# Patient Record
Sex: FEMALE | Race: OTHER | Employment: FULL TIME | URBAN - NONMETROPOLITAN AREA
[De-identification: names, ages, dates, MRNs, and addresses within clinical notes are randomized per-mention and may not be internally consistent; named-entity substitution may affect disease eponyms.]

---

## 2017-03-20 ENCOUNTER — OFFICE VISIT (OUTPATIENT)
Dept: URGENT CARE | Age: 22
End: 2017-03-20
Payer: MEDICAID

## 2017-03-20 VITALS
SYSTOLIC BLOOD PRESSURE: 100 MMHG | BODY MASS INDEX: 25.78 KG/M2 | OXYGEN SATURATION: 97 % | DIASTOLIC BLOOD PRESSURE: 64 MMHG | RESPIRATION RATE: 20 BRPM | HEART RATE: 69 BPM | TEMPERATURE: 98.5 F | WEIGHT: 132 LBS

## 2017-03-20 DIAGNOSIS — J06.9 URI WITH COUGH AND CONGESTION: Primary | ICD-10-CM

## 2017-03-20 DIAGNOSIS — J02.9 SORE THROAT: ICD-10-CM

## 2017-03-20 DIAGNOSIS — R52 BODY ACHES: ICD-10-CM

## 2017-03-20 LAB
INFLUENZA A ANTIBODY: NEGATIVE
INFLUENZA B ANTIBODY: NEGATIVE

## 2017-03-20 PROCEDURE — 87804 INFLUENZA ASSAY W/OPTIC: CPT | Performed by: NURSE PRACTITIONER

## 2017-03-20 PROCEDURE — 99213 OFFICE O/P EST LOW 20 MIN: CPT | Performed by: NURSE PRACTITIONER

## 2017-03-20 RX ORDER — GUAIFENESIN AND DEXTROMETHORPHAN HYDROBROMIDE 600; 30 MG/1; MG/1
1 TABLET, EXTENDED RELEASE ORAL EVERY 12 HOURS
Qty: 28 TABLET | Refills: 0 | Status: SHIPPED | OUTPATIENT
Start: 2017-03-20 | End: 2018-06-06

## 2017-03-20 RX ORDER — BENZONATATE 100 MG/1
100 CAPSULE ORAL 3 TIMES DAILY PRN
Qty: 21 CAPSULE | Refills: 0 | Status: SHIPPED | OUTPATIENT
Start: 2017-03-20 | End: 2017-03-27

## 2017-03-20 RX ORDER — METHYLPREDNISOLONE 4 MG/1
TABLET ORAL
Qty: 1 KIT | Refills: 0 | Status: SHIPPED | OUTPATIENT
Start: 2017-03-20 | End: 2017-03-26

## 2017-03-20 ASSESSMENT — ENCOUNTER SYMPTOMS
COUGH: 1
VOMITING: 0
NAUSEA: 0
SORE THROAT: 1
SINUS PRESSURE: 1
DIARRHEA: 0

## 2018-06-06 ENCOUNTER — HOSPITAL ENCOUNTER (EMERGENCY)
Age: 23
Discharge: HOME | End: 2018-06-07
Payer: MEDICAID

## 2018-06-06 ENCOUNTER — APPOINTMENT (OUTPATIENT)
Dept: CT IMAGING | Age: 23
End: 2018-06-06
Payer: MEDICAID

## 2018-06-06 DIAGNOSIS — K52.9 GASTROENTERITIS: Primary | ICD-10-CM

## 2018-06-06 LAB
ALBUMIN SERPL-MCNC: 4.7 G/DL (ref 3.5–5.2)
ALP BLD-CCNC: 65 U/L (ref 35–104)
ALT SERPL-CCNC: 9 U/L (ref 5–33)
ANION GAP SERPL CALCULATED.3IONS-SCNC: 12 MMOL/L (ref 7–19)
AST SERPL-CCNC: 13 U/L (ref 5–32)
BACTERIA: NORMAL /HPF
BASOPHILS ABSOLUTE: 0.1 K/UL (ref 0–0.2)
BASOPHILS RELATIVE PERCENT: 0.6 % (ref 0–1)
BILIRUB SERPL-MCNC: <0.2 MG/DL (ref 0.2–1.2)
BILIRUBIN URINE: NEGATIVE
BLOOD, URINE: NEGATIVE
BUN BLDV-MCNC: 13 MG/DL (ref 6–20)
CALCIUM SERPL-MCNC: 9.2 MG/DL (ref 8.6–10)
CHLORIDE BLD-SCNC: 103 MMOL/L (ref 98–111)
CLARITY: ABNORMAL
CO2: 27 MMOL/L (ref 22–29)
COLOR: YELLOW
CREAT SERPL-MCNC: 0.6 MG/DL (ref 0.5–0.9)
EOSINOPHILS ABSOLUTE: 0.3 K/UL (ref 0–0.6)
EOSINOPHILS RELATIVE PERCENT: 2 % (ref 0–5)
EPITHELIAL CELLS, UA: 4 /HPF (ref 0–5)
GFR NON-AFRICAN AMERICAN: >60
GLUCOSE BLD-MCNC: 92 MG/DL (ref 74–109)
GLUCOSE URINE: NEGATIVE MG/DL
HCG(URINE) PREGNANCY TEST: NEGATIVE
HCT VFR BLD CALC: 39.9 % (ref 37–47)
HEMOGLOBIN: 12.8 G/DL (ref 12–16)
HYALINE CASTS: 4 /HPF (ref 0–8)
KETONES, URINE: NEGATIVE MG/DL
LEUKOCYTE ESTERASE, URINE: NEGATIVE
LIPASE: 29 U/L (ref 13–60)
LYMPHOCYTES ABSOLUTE: 3.9 K/UL (ref 1.1–4.5)
LYMPHOCYTES RELATIVE PERCENT: 31.8 % (ref 20–40)
MCH RBC QN AUTO: 29.3 PG (ref 27–31)
MCHC RBC AUTO-ENTMCNC: 32.1 G/DL (ref 33–37)
MCV RBC AUTO: 91.3 FL (ref 81–99)
MONOCYTES ABSOLUTE: 0.7 K/UL (ref 0–0.9)
MONOCYTES RELATIVE PERCENT: 5.9 % (ref 0–10)
NEUTROPHILS ABSOLUTE: 7.3 K/UL (ref 1.5–7.5)
NEUTROPHILS RELATIVE PERCENT: 59.4 % (ref 50–65)
NITRITE, URINE: POSITIVE
PDW BLD-RTO: 11.8 % (ref 11.5–14.5)
PH UA: 7
PLATELET # BLD: 347 K/UL (ref 130–400)
PMV BLD AUTO: 9.9 FL (ref 9.4–12.3)
POTASSIUM SERPL-SCNC: 3.8 MMOL/L (ref 3.5–5)
PROTEIN UA: NEGATIVE MG/DL
RBC # BLD: 4.37 M/UL (ref 4.2–5.4)
RBC UA: 2 /HPF (ref 0–4)
SODIUM BLD-SCNC: 142 MMOL/L (ref 136–145)
SPECIFIC GRAVITY UA: 1.03
TOTAL PROTEIN: 7.6 G/DL (ref 6.6–8.7)
UROBILINOGEN, URINE: 1 E.U./DL
WBC # BLD: 12.4 K/UL (ref 4.8–10.8)
WBC UA: 1 /HPF (ref 0–5)

## 2018-06-06 PROCEDURE — 85025 COMPLETE CBC W/AUTO DIFF WBC: CPT

## 2018-06-06 PROCEDURE — 87186 SC STD MICRODIL/AGAR DIL: CPT

## 2018-06-06 PROCEDURE — 81001 URINALYSIS AUTO W/SCOPE: CPT

## 2018-06-06 PROCEDURE — 99284 EMERGENCY DEPT VISIT MOD MDM: CPT

## 2018-06-06 PROCEDURE — 81025 URINE PREGNANCY TEST: CPT

## 2018-06-06 PROCEDURE — 80053 COMPREHEN METABOLIC PANEL: CPT

## 2018-06-06 PROCEDURE — 87086 URINE CULTURE/COLONY COUNT: CPT

## 2018-06-06 PROCEDURE — 83690 ASSAY OF LIPASE: CPT

## 2018-06-06 PROCEDURE — 36415 COLL VENOUS BLD VENIPUNCTURE: CPT

## 2018-06-06 PROCEDURE — 99284 EMERGENCY DEPT VISIT MOD MDM: CPT | Performed by: NURSE PRACTITIONER

## 2018-06-06 PROCEDURE — 2580000003 HC RX 258: Performed by: NURSE PRACTITIONER

## 2018-06-06 PROCEDURE — 6360000004 HC RX CONTRAST MEDICATION: Performed by: NURSE PRACTITIONER

## 2018-06-06 PROCEDURE — 74177 CT ABD & PELVIS W/CONTRAST: CPT

## 2018-06-06 RX ORDER — 0.9 % SODIUM CHLORIDE 0.9 %
1000 INTRAVENOUS SOLUTION INTRAVENOUS ONCE
Status: COMPLETED | OUTPATIENT
Start: 2018-06-06 | End: 2018-06-06

## 2018-06-06 RX ADMIN — IOPAMIDOL 90 ML: 755 INJECTION, SOLUTION INTRAVENOUS at 23:08

## 2018-06-06 RX ADMIN — SODIUM CHLORIDE 1000 ML: 9 INJECTION, SOLUTION INTRAVENOUS at 22:01

## 2018-06-06 ASSESSMENT — ENCOUNTER SYMPTOMS
DIARRHEA: 0
SINUS PRESSURE: 0
SHORTNESS OF BREATH: 0
SORE THROAT: 0
RHINORRHEA: 0
NAUSEA: 1
TROUBLE SWALLOWING: 0
COUGH: 0
VOMITING: 1
CONSTIPATION: 0
ABDOMINAL PAIN: 1

## 2018-06-06 ASSESSMENT — PAIN SCALES - GENERAL: PAINLEVEL_OUTOF10: 7

## 2018-06-07 VITALS
OXYGEN SATURATION: 98 % | WEIGHT: 145 LBS | SYSTOLIC BLOOD PRESSURE: 124 MMHG | TEMPERATURE: 97.9 F | HEIGHT: 60 IN | HEART RATE: 71 BPM | DIASTOLIC BLOOD PRESSURE: 77 MMHG | BODY MASS INDEX: 28.47 KG/M2 | RESPIRATION RATE: 17 BRPM

## 2018-06-07 RX ORDER — ONDANSETRON 4 MG/1
4 TABLET, ORALLY DISINTEGRATING ORAL EVERY 8 HOURS PRN
Qty: 12 TABLET | Refills: 0 | Status: SHIPPED | OUTPATIENT
Start: 2018-06-06 | End: 2019-02-04

## 2018-06-08 LAB
ORGANISM: ABNORMAL
URINE CULTURE, ROUTINE: ABNORMAL
URINE CULTURE, ROUTINE: ABNORMAL

## 2018-08-16 ENCOUNTER — APPOINTMENT (OUTPATIENT)
Dept: PREADMISSION TESTING | Facility: HOSPITAL | Age: 23
End: 2018-08-16

## 2018-08-16 ENCOUNTER — PREP FOR SURGERY (OUTPATIENT)
Dept: OTHER | Facility: HOSPITAL | Age: 23
End: 2018-08-16

## 2018-08-16 VITALS
HEART RATE: 67 BPM | WEIGHT: 145.72 LBS | RESPIRATION RATE: 16 BRPM | BODY MASS INDEX: 27.51 KG/M2 | HEIGHT: 61 IN | SYSTOLIC BLOOD PRESSURE: 118 MMHG | OXYGEN SATURATION: 99 % | DIASTOLIC BLOOD PRESSURE: 65 MMHG

## 2018-08-16 DIAGNOSIS — N87.1 MODERATE DYSPLASIA OF CERVIX (CIN II): Primary | ICD-10-CM

## 2018-08-16 DIAGNOSIS — N87.1 MODERATE DYSPLASIA OF CERVIX (CIN II): ICD-10-CM

## 2018-08-16 LAB
BACTERIA UR QL AUTO: ABNORMAL /HPF
BASOPHILS # BLD AUTO: 0.07 10*3/MM3 (ref 0–0.2)
BASOPHILS NFR BLD AUTO: 0.5 % (ref 0–2)
BILIRUB UR QL STRIP: NEGATIVE
CLARITY UR: ABNORMAL
COLOR UR: YELLOW
DEPRECATED RDW RBC AUTO: 40.1 FL (ref 40–54)
EOSINOPHIL # BLD AUTO: 0.17 10*3/MM3 (ref 0–0.7)
EOSINOPHIL NFR BLD AUTO: 1.3 % (ref 0–4)
ERYTHROCYTE [DISTWIDTH] IN BLOOD BY AUTOMATED COUNT: 12.4 % (ref 12–15)
GLUCOSE UR STRIP-MCNC: NEGATIVE MG/DL
HCT VFR BLD AUTO: 38.2 % (ref 37–47)
HGB BLD-MCNC: 13 G/DL (ref 12–16)
HGB UR QL STRIP.AUTO: NEGATIVE
IMM GRANULOCYTES # BLD: 0.06 10*3/MM3 (ref 0–0.03)
IMM GRANULOCYTES NFR BLD: 0.5 % (ref 0–5)
KETONES UR QL STRIP: NEGATIVE
LEUKOCYTE ESTERASE UR QL STRIP.AUTO: ABNORMAL
LYMPHOCYTES # BLD AUTO: 1.71 10*3/MM3 (ref 0.72–4.86)
LYMPHOCYTES NFR BLD AUTO: 13.4 % (ref 15–45)
MCH RBC QN AUTO: 30 PG (ref 28–32)
MCHC RBC AUTO-ENTMCNC: 34 G/DL (ref 33–36)
MCV RBC AUTO: 88.2 FL (ref 82–98)
MONOCYTES # BLD AUTO: 0.53 10*3/MM3 (ref 0.19–1.3)
MONOCYTES NFR BLD AUTO: 4.1 % (ref 4–12)
NEUTROPHILS # BLD AUTO: 10.26 10*3/MM3 (ref 1.87–8.4)
NEUTROPHILS NFR BLD AUTO: 80.2 % (ref 39–78)
NITRITE UR QL STRIP: POSITIVE
NRBC BLD MANUAL-RTO: 0 /100 WBC (ref 0–0)
PH UR STRIP.AUTO: 8 [PH] (ref 5–8)
PLATELET # BLD AUTO: 360 10*3/MM3 (ref 130–400)
PMV BLD AUTO: 9.8 FL (ref 6–12)
PROT UR QL STRIP: NEGATIVE
RBC # BLD AUTO: 4.33 10*6/MM3 (ref 4.2–5.4)
RBC # UR: ABNORMAL /HPF
REF LAB TEST METHOD: ABNORMAL
SP GR UR STRIP: 1.02 (ref 1–1.03)
SQUAMOUS #/AREA URNS HPF: ABNORMAL /HPF
UROBILINOGEN UR QL STRIP: ABNORMAL
WBC NRBC COR # BLD: 12.8 10*3/MM3 (ref 4.8–10.8)
WBC UR QL AUTO: ABNORMAL /HPF

## 2018-08-16 PROCEDURE — 87077 CULTURE AEROBIC IDENTIFY: CPT | Performed by: OBSTETRICS & GYNECOLOGY

## 2018-08-16 PROCEDURE — 85025 COMPLETE CBC W/AUTO DIFF WBC: CPT | Performed by: OBSTETRICS & GYNECOLOGY

## 2018-08-16 PROCEDURE — 87186 SC STD MICRODIL/AGAR DIL: CPT | Performed by: OBSTETRICS & GYNECOLOGY

## 2018-08-16 PROCEDURE — 36415 COLL VENOUS BLD VENIPUNCTURE: CPT

## 2018-08-16 PROCEDURE — 87086 URINE CULTURE/COLONY COUNT: CPT | Performed by: OBSTETRICS & GYNECOLOGY

## 2018-08-16 PROCEDURE — 81001 URINALYSIS AUTO W/SCOPE: CPT | Performed by: OBSTETRICS & GYNECOLOGY

## 2018-08-16 RX ORDER — SODIUM CHLORIDE, SODIUM LACTATE, POTASSIUM CHLORIDE, CALCIUM CHLORIDE 600; 310; 30; 20 MG/100ML; MG/100ML; MG/100ML; MG/100ML
125 INJECTION, SOLUTION INTRAVENOUS CONTINUOUS
Status: CANCELLED | OUTPATIENT
Start: 2018-08-22

## 2018-08-16 RX ORDER — SODIUM CHLORIDE 0.9 % (FLUSH) 0.9 %
1-10 SYRINGE (ML) INJECTION AS NEEDED
Status: CANCELLED | OUTPATIENT
Start: 2018-08-16

## 2018-08-16 NOTE — DISCHARGE INSTRUCTIONS
DAY OF SURGERY INSTRUCTIONS        YOUR SURGEON: ***yassine kinney    PROCEDURE: ***kay    DATE OF SURGERY: ***08/22/2018    ARRIVAL TIME: AS DIRECTED BY OFFICE    DAY OF SURGERY TAKE ONLY THESE MEDICATIONS UNLESS OTHERWISE INSTRUCTED BY YOUR PHYSICIAN: ***          MANAGING PAIN AFTER SURGERY    We know you are probably wondering what your pain will be like after surgery.  Following surgery it is unrealistic to expect you will not have pain.   Pain is how our bodies let us know that something is wrong or cautions us to be careful.  That said, our goal is to make your pain tolerable.    Methods we may use to treat your pain include (oral or IV medications, PCAs, epidurals, nerve blocks, etc.)   While some procedures require IV pain medications for a short time after surgery, transitioning to pain medications by mouth allows for better management of pain.   Your nurse will encourage you to take oral pain medications whenever possible.  IV medications work almost immediately, but only last a short while.  Taking medications by mouth allows for a more constant level of medication in your blood stream for a longer period of time.      Once your pain is out of control it is harder to get back under control.  It is important you are aware when your next dose of pain medication is due.  If you are admitted, your nurse may write the time of your next dose on the white board in your room to help you remember.      We are interested in your pain and encourage you to inform us about aggravating factors during your visit.   Many times a simple repositioning every few hours can make a big difference.    If your physician says it is okay, do not let your pain prevent you from getting out of bed. Be sure to call your nurse for assistance prior to getting up so you do not fall.      Before surgery, please decide your tolerable pain goal.  These faces help describe the pain ratings we use on a 0-10 scale.   Be prepared to tell us  your goal and whether or not you take pain or anxiety medications at home.            BEFORE YOU COME TO THE HOSPITAL  (Pre-op instructions)  • Do not eat, drink, smoke or chew gum after midnight the night before surgery.  This also includes no mints.  • Morning of surgery take only the medicines you have been instructed with a sip of water unless otherwise instructed  by your physician.  • Do not shave, wear makeup or dark nail polish.  • Remove all jewelry including rings.  • Leave anything you consider valuable at home.  • Leave your suitcase in the car until after your surgery.  • Bring the following with you if applicable:  o Picture ID and insurance, Medicare or Medicaid cards  o Co-pay/deductible required by insurance (cash, check, credit card)  o Copy of advance directive, living will or power-of- documents if not brought to PAT  o CPAP or BIPAP mask and tubing  o Relaxation aids (MP3 player, book, magazine)  • On the day of surgery check in at registration located at the main entrance of the hospital.       Outpatient Surgery Guidelines, Adult  Outpatient procedures are those for which the person having the procedure is allowed to go home the same day as the procedure. Various procedures are done on an outpatient basis. You should follow some general guidelines if you will be having an outpatient procedure.  LET YOUR HEALTH CARE PROVIDER KNOW ABOUT:  · Any allergies you have.  · All medicines you are taking, including vitamins, herbs, eye drops, creams, and over-the-counter medicines.  · Previous problems you or members of your family have had with the use of anesthetics.  · Any blood disorders you have.  · Previous surgeries you have had.  · Medical conditions you have.  RISKS AND COMPLICATIONS  Your health care provider will discuss possible risks and complications with you before surgery. Common risks and complications include:    · Problems due to the use of anesthetics.  · Blood loss and  replacement (does not apply to minor surgical procedures).  · Temporary increase in pain due to surgery.  · Uncorrected pain or problems that the surgery was meant to correct.  · Infection.  · New damage.  BEFORE THE PROCEDURE  · Ask your health care provider about changing or stopping your regular medicines. You may need to stop taking certain medicines in the days or weeks before the procedure.  · Stop smoking at least 2 weeks before surgery. This lowers your risk for complications during and after surgery. Ask your health care provider for help with this if needed.  · Eat your usual meals and a light supper the day before surgery. Continue fluid intake. Do not drink alcohol.  · Do not eat or drink after midnight the night before your surgery.   · Arrange for someone to take you home and to stay with you for 24 hours after the procedure. Medicine given for your procedure may affect your ability to drive or to care for yourself.  · Call your health care provider's office if you develop an illness or problem that may prevent you from safely having your procedure.  AFTER THE PROCEDURE  After surgery, you will be taken to a recovery area, where your progress will be monitored. If there are no complications, you will be allowed to go home when you are awake, stable, and taking fluids well. You may have numbness around the surgical site. Healing will take some time. You will have tenderness at the surgical site and may have some swelling and bruising. You may also have some nausea.  HOME CARE INSTRUCTIONS  · Do not drive for 24 hours, or as directed by your health care provider. Do not drive while taking prescription pain medicines.  · Do not drink alcohol for 24 hours.  · Do not make important decisions or sign legal documents for 24 hours.  · You may resume a normal diet and activities as directed.  · Do not lift anything heavier than 10 pounds (4.5 kg) or play contact sports until your health care provider says it is  okay.  · Change your bandages (dressings) as directed.  · Only take over-the-counter or prescription medicines as directed by your health care provider.  · Follow up with your health care provider as directed.  SEEK MEDICAL CARE IF:  · You have increased bleeding (more than a small spot) from the surgical site.  · You have redness, swelling, or increasing pain in the wound.  · You see pus coming from the wound.  · You have a fever.  · You notice a bad smell coming from the wound or dressing.  · You feel lightheaded or faint.  · You develop a rash.  · You have trouble breathing.  · You develop allergies.  MAKE SURE YOU:  · Understand these instructions.  · Will watch your condition.  · Will get help right away if you are not doing well or get worse.     This information is not intended to replace advice given to you by your health care provider. Make sure you discuss any questions you have with your health care provider.     Document Released: 09/12/2002 Document Revised: 05/03/2016 Document Reviewed: 05/22/2014  [employer] Interactive Patient Education ©2016 [employer]       Fall Prevention in Hospitals, Adult  As a hospital patient, your condition and the treatments you receive can increase your risk for falls. Some additional risk factors for falls in a hospital include:  · Being in an unfamiliar environment.  · Being on bed rest.  · Your surgery.  · Taking certain medicines.  · Your tubing requirements, such as intravenous (IV) therapy or catheters.  It is important that you learn how to decrease fall risks while at the hospital. Below are important tips that can help prevent falls.  SAFETY TIPS FOR PREVENTING FALLS  Talk about your risk of falling.  · Ask your health care provider why you are at risk for falling. Is it your medicine, illness, tubing placement, or something else?  · Make a plan with your health care provider to keep you safe from falls.  · Ask your health care provider or pharmacist about side  effects of your medicines. Some medicines can make you dizzy or affect your coordination.  Ask for help.  · Ask for help before getting out of bed. You may need to press your call button.  · Ask for assistance in getting safely to the toilet.  · Ask for a walker or cane to be put at your bedside. Ask that most of the side rails on your bed be placed up before your health care provider leaves the room.  · Ask family or friends to sit with you.  · Ask for things that are out of your reach, such as your glasses, hearing aids, telephone, bedside table, or call button.  Follow these tips to avoid falling:  · Stay lying or seated, rather than standing, while waiting for help.  · Wear rubber-soled slippers or shoes whenever you walk in the hospital.  · Avoid quick, sudden movements.  ¨ Change positions slowly.  ¨ Sit on the side of your bed before standing.  ¨ Stand up slowly and wait before you start to walk.  · Let your health care provider know if there is a spill on the floor.  · Pay careful attention to the medical equipment, electrical cords, and tubes around you.  · When you need help, use your call button by your bed or in the bathroom. Wait for one of your health care providers to help you.  · If you feel dizzy or unsure of your footing, return to bed and wait for assistance.  · Avoid being distracted by the TV, telephone, or another person in your room.  · Do not lean or support yourself on rolling objects, such as IV poles or bedside tables.     This information is not intended to replace advice given to you by your health care provider. Make sure you discuss any questions you have with your health care provider.     Document Released: 12/15/2001 Document Revised: 01/08/2016 Document Reviewed: 08/25/2013  [employer] Interactive Patient Education ©2016 [employer]       Surgical Site Infections FAQs  What is a Surgical Site Infection (SSI)?  A surgical site infection is an infection that occurs after surgery in  the part of the body where the surgery took place. Most patients who have surgery do not develop an infection. However, infections develop in about 1 to 3 out of every 100 patients who have surgery.  Some of the common symptoms of a surgical site infection are:  · Redness and pain around the area where you had surgery  · Drainage of cloudy fluid from your surgical wound  · Fever  Can SSIs be treated?  Yes. Most surgical site infections can be treated with antibiotics. The antibiotic given to you depends on the bacteria (germs) causing the infection. Sometimes patients with SSIs also need another surgery to treat the infection.  What are some of the things that hospitals are doing to prevent SSIs?  To prevent SSIs, doctors, nurses, and other healthcare providers:  · Clean their hands and arms up to their elbows with an antiseptic agent just before the surgery.  · Clean their hands with soap and water or an alcohol-based hand rub before and after caring for each patient.  · May remove some of your hair immediately before your surgery using electric clippers if the hair is in the same area where the procedure will occur. They should not shave you with a razor.  · Wear special hair covers, masks, gowns, and gloves during surgery to keep the surgery area clean.  · Give you antibiotics before your surgery starts. In most cases, you should get antibiotics within 60 minutes before the surgery starts and the antibiotics should be stopped within 24 hours after surgery.  · Clean the skin at the site of your surgery with a special soap that kills germs.  What can I do to help prevent SSIs?  Before your surgery:  · Tell your doctor about other medical problems you may have. Health problems such as allergies, diabetes, and obesity could affect your surgery and your treatment.  · Quit smoking. Patients who smoke get more infections. Talk to your doctor about how you can quit before your surgery.  · Do not shave near where you will  have surgery. Shaving with a razor can irritate your skin and make it easier to develop an infection.  At the time of your surgery:  · Speak up if someone tries to shave you with a razor before surgery. Ask why you need to be shaved and talk with your surgeon if you have any concerns.  · Ask if you will get antibiotics before surgery.  After your surgery:  · Make sure that your healthcare providers clean their hands before examining you, either with soap and water or an alcohol-based hand rub.  · If you do not see your providers clean their hands, please ask them to do so.  · Family and friends who visit you should not touch the surgical wound or dressings.  · Family and friends should clean their hands with soap and water or an alcohol-based hand rub before and after visiting you. If you do not see them clean their hands, ask them to clean their hands.  What do I need to do when I go home from the hospital?  · Before you go home, your doctor or nurse should explain everything you need to know about taking care of your wound. Make sure you understand how to care for your wound before you leave the hospital.  · Always clean your hands before and after caring for your wound.  · Before you go home, make sure you know who to contact if you have questions or problems after you get home.  · If you have any symptoms of an infection, such as redness and pain at the surgery site, drainage, or fever, call your doctor immediately.  If you have additional questions, please ask your doctor or nurse.  Developed and co-sponsored by The Society for Healthcare Epidemiology of [place] (SHEA); Infectious Diseases Society of [place] (IDSA); American Hospital Association; Association for Professionals in Infection Control and Epidemiology (APIC); Centers for Disease Control and Prevention (CDC); and The Joint Commission.     This information is not intended to replace advice given to you by your health care provider. Make sure you  discuss any questions you have with your health care provider.     Document Released: 12/23/2014 Document Revised: 01/08/2016 Document Reviewed: 03/02/2016  [employer] Interactive Patient Education ©2016 [employer]     PATIENT/FAMILY/RESPONSIBLE PARTY VERBALIZES UNDERSTANDING OF ABOVE EDUCATION.  COPY OF PAIN SCALE GIVEN AND REVIEWED WITH VERBALIZED UNDERSTANDING.

## 2018-08-18 LAB — BACTERIA SPEC AEROBE CULT: ABNORMAL

## 2018-08-22 ENCOUNTER — HOSPITAL ENCOUNTER (OUTPATIENT)
Facility: HOSPITAL | Age: 23
Setting detail: HOSPITAL OUTPATIENT SURGERY
Discharge: HOME | End: 2018-08-22
Attending: OBSTETRICS & GYNECOLOGY | Admitting: OBSTETRICS & GYNECOLOGY

## 2018-08-22 ENCOUNTER — ANESTHESIA EVENT (OUTPATIENT)
Dept: PERIOP | Facility: HOSPITAL | Age: 23
End: 2018-08-22

## 2018-08-22 ENCOUNTER — ANESTHESIA (OUTPATIENT)
Dept: PERIOP | Facility: HOSPITAL | Age: 23
End: 2018-08-22

## 2018-08-22 VITALS
RESPIRATION RATE: 16 BRPM | TEMPERATURE: 97.4 F | SYSTOLIC BLOOD PRESSURE: 98 MMHG | OXYGEN SATURATION: 98 % | DIASTOLIC BLOOD PRESSURE: 58 MMHG | HEART RATE: 64 BPM

## 2018-08-22 DIAGNOSIS — N87.9 DYSPLASIA OF CERVIX: ICD-10-CM

## 2018-08-22 LAB
ABO GROUP BLD: NORMAL
BLD GP AB SCN SERPL QL: NEGATIVE
HCG INTACT+B SERPL-ACNC: <2.39 MIU/ML (ref 0–5)
RH BLD: POSITIVE
T&S EXPIRATION DATE: NORMAL

## 2018-08-22 PROCEDURE — 86900 BLOOD TYPING SEROLOGIC ABO: CPT | Performed by: OBSTETRICS & GYNECOLOGY

## 2018-08-22 PROCEDURE — 25010000002 FENTANYL CITRATE (PF) 100 MCG/2ML SOLUTION: Performed by: NURSE ANESTHETIST, CERTIFIED REGISTERED

## 2018-08-22 PROCEDURE — 88307 TISSUE EXAM BY PATHOLOGIST: CPT | Performed by: OBSTETRICS & GYNECOLOGY

## 2018-08-22 PROCEDURE — 25010000002 ONDANSETRON PER 1 MG: Performed by: NURSE ANESTHETIST, CERTIFIED REGISTERED

## 2018-08-22 PROCEDURE — 25010000002 KETOROLAC TROMETHAMINE PER 15 MG: Performed by: NURSE ANESTHETIST, CERTIFIED REGISTERED

## 2018-08-22 PROCEDURE — 25010000002 DEXAMETHASONE PER 1 MG: Performed by: ANESTHESIOLOGY

## 2018-08-22 PROCEDURE — 25010000002 PROPOFOL 10 MG/ML EMULSION: Performed by: NURSE ANESTHETIST, CERTIFIED REGISTERED

## 2018-08-22 PROCEDURE — 84702 CHORIONIC GONADOTROPIN TEST: CPT | Performed by: OBSTETRICS & GYNECOLOGY

## 2018-08-22 PROCEDURE — 25010000002 SUCCINYLCHOLINE PER 20 MG: Performed by: NURSE ANESTHETIST, CERTIFIED REGISTERED

## 2018-08-22 PROCEDURE — 25010000002 MIDAZOLAM PER 1 MG: Performed by: ANESTHESIOLOGY

## 2018-08-22 PROCEDURE — 25010000002 DEXAMETHASONE PER 1 MG: Performed by: NURSE ANESTHETIST, CERTIFIED REGISTERED

## 2018-08-22 PROCEDURE — 25010000002 ONDANSETRON PER 1 MG: Performed by: ANESTHESIOLOGY

## 2018-08-22 PROCEDURE — 86850 RBC ANTIBODY SCREEN: CPT | Performed by: OBSTETRICS & GYNECOLOGY

## 2018-08-22 PROCEDURE — 86901 BLOOD TYPING SEROLOGIC RH(D): CPT | Performed by: OBSTETRICS & GYNECOLOGY

## 2018-08-22 RX ORDER — KETOROLAC TROMETHAMINE 30 MG/ML
INJECTION, SOLUTION INTRAMUSCULAR; INTRAVENOUS AS NEEDED
Status: DISCONTINUED | OUTPATIENT
Start: 2018-08-22 | End: 2018-08-22 | Stop reason: SURG

## 2018-08-22 RX ORDER — FENTANYL CITRATE 50 UG/ML
INJECTION, SOLUTION INTRAMUSCULAR; INTRAVENOUS AS NEEDED
Status: DISCONTINUED | OUTPATIENT
Start: 2018-08-22 | End: 2018-08-22 | Stop reason: SURG

## 2018-08-22 RX ORDER — OXYCODONE AND ACETAMINOPHEN 10; 325 MG/1; MG/1
1 TABLET ORAL ONCE AS NEEDED
Status: DISCONTINUED | OUTPATIENT
Start: 2018-08-22 | End: 2018-08-22 | Stop reason: HOSPADM

## 2018-08-22 RX ORDER — MEPERIDINE HYDROCHLORIDE 25 MG/ML
12.5 INJECTION INTRAMUSCULAR; INTRAVENOUS; SUBCUTANEOUS
Status: DISCONTINUED | OUTPATIENT
Start: 2018-08-22 | End: 2018-08-22 | Stop reason: HOSPADM

## 2018-08-22 RX ORDER — DEXAMETHASONE SODIUM PHOSPHATE 4 MG/ML
4 INJECTION, SOLUTION INTRA-ARTICULAR; INTRALESIONAL; INTRAMUSCULAR; INTRAVENOUS; SOFT TISSUE ONCE AS NEEDED
Status: COMPLETED | OUTPATIENT
Start: 2018-08-22 | End: 2018-08-22

## 2018-08-22 RX ORDER — SODIUM CHLORIDE 0.9 % (FLUSH) 0.9 %
1-10 SYRINGE (ML) INJECTION AS NEEDED
Status: DISCONTINUED | OUTPATIENT
Start: 2018-08-22 | End: 2018-08-22 | Stop reason: HOSPADM

## 2018-08-22 RX ORDER — MIDAZOLAM HYDROCHLORIDE 1 MG/ML
1 INJECTION INTRAMUSCULAR; INTRAVENOUS
Status: DISCONTINUED | OUTPATIENT
Start: 2018-08-22 | End: 2018-08-22 | Stop reason: HOSPADM

## 2018-08-22 RX ORDER — ACETAMINOPHEN 500 MG
1000 TABLET ORAL ONCE
Status: COMPLETED | OUTPATIENT
Start: 2018-08-22 | End: 2018-08-22

## 2018-08-22 RX ORDER — OXYCODONE AND ACETAMINOPHEN 7.5; 325 MG/1; MG/1
2 TABLET ORAL ONCE AS NEEDED
Status: DISCONTINUED | OUTPATIENT
Start: 2018-08-22 | End: 2018-08-22 | Stop reason: HOSPADM

## 2018-08-22 RX ORDER — NITROFURANTOIN MACROCRYSTALS 100 MG/1
100 CAPSULE ORAL 2 TIMES DAILY
COMMUNITY
Start: 2018-08-16 | End: 2018-08-26

## 2018-08-22 RX ORDER — NALOXONE HCL 0.4 MG/ML
0.4 VIAL (ML) INJECTION AS NEEDED
Status: DISCONTINUED | OUTPATIENT
Start: 2018-08-22 | End: 2018-08-22 | Stop reason: HOSPADM

## 2018-08-22 RX ORDER — IPRATROPIUM BROMIDE AND ALBUTEROL SULFATE 2.5; .5 MG/3ML; MG/3ML
3 SOLUTION RESPIRATORY (INHALATION) ONCE AS NEEDED
Status: DISCONTINUED | OUTPATIENT
Start: 2018-08-22 | End: 2018-08-22 | Stop reason: HOSPADM

## 2018-08-22 RX ORDER — LIDOCAINE HYDROCHLORIDE 20 MG/ML
INJECTION, SOLUTION INFILTRATION; PERINEURAL AS NEEDED
Status: DISCONTINUED | OUTPATIENT
Start: 2018-08-22 | End: 2018-08-22 | Stop reason: SURG

## 2018-08-22 RX ORDER — LIDOCAINE HYDROCHLORIDE 40 MG/ML
SOLUTION TOPICAL AS NEEDED
Status: DISCONTINUED | OUTPATIENT
Start: 2018-08-22 | End: 2018-08-22 | Stop reason: SURG

## 2018-08-22 RX ORDER — FENTANYL CITRATE 50 UG/ML
25 INJECTION, SOLUTION INTRAMUSCULAR; INTRAVENOUS AS NEEDED
Status: DISCONTINUED | OUTPATIENT
Start: 2018-08-22 | End: 2018-08-22 | Stop reason: HOSPADM

## 2018-08-22 RX ORDER — IBUPROFEN 600 MG/1
600 TABLET ORAL ONCE AS NEEDED
Status: DISCONTINUED | OUTPATIENT
Start: 2018-08-22 | End: 2018-08-22 | Stop reason: HOSPADM

## 2018-08-22 RX ORDER — ROCURONIUM BROMIDE 10 MG/ML
INJECTION, SOLUTION INTRAVENOUS AS NEEDED
Status: DISCONTINUED | OUTPATIENT
Start: 2018-08-22 | End: 2018-08-22 | Stop reason: SURG

## 2018-08-22 RX ORDER — OXYCODONE AND ACETAMINOPHEN 7.5; 325 MG/1; MG/1
1 TABLET ORAL EVERY 6 HOURS PRN
Status: DISCONTINUED | OUTPATIENT
Start: 2018-08-22 | End: 2018-08-22 | Stop reason: HOSPADM

## 2018-08-22 RX ORDER — ONDANSETRON 2 MG/ML
4 INJECTION INTRAMUSCULAR; INTRAVENOUS ONCE AS NEEDED
Status: COMPLETED | OUTPATIENT
Start: 2018-08-22 | End: 2018-08-22

## 2018-08-22 RX ORDER — LABETALOL HYDROCHLORIDE 5 MG/ML
5 INJECTION, SOLUTION INTRAVENOUS
Status: DISCONTINUED | OUTPATIENT
Start: 2018-08-22 | End: 2018-08-22 | Stop reason: HOSPADM

## 2018-08-22 RX ORDER — SUCCINYLCHOLINE CHLORIDE 20 MG/ML
INJECTION INTRAMUSCULAR; INTRAVENOUS AS NEEDED
Status: DISCONTINUED | OUTPATIENT
Start: 2018-08-22 | End: 2018-08-22 | Stop reason: SURG

## 2018-08-22 RX ORDER — PROMETHAZINE HYDROCHLORIDE 25 MG/ML
12.5 INJECTION, SOLUTION INTRAMUSCULAR; INTRAVENOUS ONCE AS NEEDED
Status: DISCONTINUED | OUTPATIENT
Start: 2018-08-22 | End: 2018-08-22 | Stop reason: HOSPADM

## 2018-08-22 RX ORDER — OXYCODONE HYDROCHLORIDE AND ACETAMINOPHEN 5; 325 MG/1; MG/1
1 TABLET ORAL EVERY 6 HOURS PRN
Qty: 5 TABLET | Refills: 0 | Status: SHIPPED | OUTPATIENT
Start: 2018-08-22 | End: 2021-02-03

## 2018-08-22 RX ORDER — MIDAZOLAM HYDROCHLORIDE 1 MG/ML
2 INJECTION INTRAMUSCULAR; INTRAVENOUS
Status: DISCONTINUED | OUTPATIENT
Start: 2018-08-22 | End: 2018-08-22 | Stop reason: HOSPADM

## 2018-08-22 RX ORDER — SODIUM CHLORIDE, SODIUM LACTATE, POTASSIUM CHLORIDE, CALCIUM CHLORIDE 600; 310; 30; 20 MG/100ML; MG/100ML; MG/100ML; MG/100ML
125 INJECTION, SOLUTION INTRAVENOUS CONTINUOUS
Status: DISCONTINUED | OUTPATIENT
Start: 2018-08-22 | End: 2018-08-22 | Stop reason: HOSPADM

## 2018-08-22 RX ORDER — DEXAMETHASONE SODIUM PHOSPHATE 4 MG/ML
INJECTION, SOLUTION INTRA-ARTICULAR; INTRALESIONAL; INTRAMUSCULAR; INTRAVENOUS; SOFT TISSUE AS NEEDED
Status: DISCONTINUED | OUTPATIENT
Start: 2018-08-22 | End: 2018-08-22 | Stop reason: SURG

## 2018-08-22 RX ORDER — PROPOFOL 10 MG/ML
VIAL (ML) INTRAVENOUS AS NEEDED
Status: DISCONTINUED | OUTPATIENT
Start: 2018-08-22 | End: 2018-08-22 | Stop reason: SURG

## 2018-08-22 RX ORDER — IBUPROFEN 600 MG/1
600 TABLET ORAL EVERY 6 HOURS PRN
Status: DISCONTINUED | OUTPATIENT
Start: 2018-08-22 | End: 2018-08-22 | Stop reason: HOSPADM

## 2018-08-22 RX ORDER — SODIUM CHLORIDE, SODIUM LACTATE, POTASSIUM CHLORIDE, CALCIUM CHLORIDE 600; 310; 30; 20 MG/100ML; MG/100ML; MG/100ML; MG/100ML
100 INJECTION, SOLUTION INTRAVENOUS CONTINUOUS
Status: DISCONTINUED | OUTPATIENT
Start: 2018-08-22 | End: 2018-08-22 | Stop reason: HOSPADM

## 2018-08-22 RX ORDER — ONDANSETRON 2 MG/ML
INJECTION INTRAMUSCULAR; INTRAVENOUS AS NEEDED
Status: DISCONTINUED | OUTPATIENT
Start: 2018-08-22 | End: 2018-08-22 | Stop reason: SURG

## 2018-08-22 RX ORDER — METOCLOPRAMIDE HYDROCHLORIDE 5 MG/ML
5 INJECTION INTRAMUSCULAR; INTRAVENOUS
Status: DISCONTINUED | OUTPATIENT
Start: 2018-08-22 | End: 2018-08-22 | Stop reason: HOSPADM

## 2018-08-22 RX ORDER — LUGOLS 0.4 G/G
LIQUID TOPICAL AS NEEDED
Status: DISCONTINUED | OUTPATIENT
Start: 2018-08-22 | End: 2018-08-22 | Stop reason: HOSPADM

## 2018-08-22 RX ORDER — LIDOCAINE HYDROCHLORIDE 20 MG/ML
INJECTION, SOLUTION INFILTRATION; PERINEURAL AS NEEDED
Status: DISCONTINUED | OUTPATIENT
Start: 2018-08-22 | End: 2018-08-22 | Stop reason: HOSPADM

## 2018-08-22 RX ORDER — ONDANSETRON 2 MG/ML
4 INJECTION INTRAMUSCULAR; INTRAVENOUS ONCE AS NEEDED
Status: DISCONTINUED | OUTPATIENT
Start: 2018-08-22 | End: 2018-08-22 | Stop reason: HOSPADM

## 2018-08-22 RX ORDER — IBUPROFEN 600 MG/1
600 TABLET ORAL EVERY 6 HOURS PRN
Qty: 90 TABLET | Refills: 2 | Status: SHIPPED | OUTPATIENT
Start: 2018-08-22 | End: 2021-02-03

## 2018-08-22 RX ADMIN — LIDOCAINE HYDROCHLORIDE 1 EACH: 40 SOLUTION TOPICAL at 07:02

## 2018-08-22 RX ADMIN — SODIUM CHLORIDE, POTASSIUM CHLORIDE, SODIUM LACTATE AND CALCIUM CHLORIDE 125 ML/HR: 600; 310; 30; 20 INJECTION, SOLUTION INTRAVENOUS at 06:03

## 2018-08-22 RX ADMIN — KETOROLAC TROMETHAMINE 30 MG: 30 INJECTION, SOLUTION INTRAMUSCULAR at 07:06

## 2018-08-22 RX ADMIN — SUCCINYLCHOLINE CHLORIDE 140 MG: 20 INJECTION, SOLUTION INTRAMUSCULAR; INTRAVENOUS at 07:02

## 2018-08-22 RX ADMIN — KETOROLAC TROMETHAMINE 30 MG: 30 INJECTION, SOLUTION INTRAMUSCULAR at 07:08

## 2018-08-22 RX ADMIN — FENTANYL CITRATE 100 MCG: 50 INJECTION, SOLUTION INTRAMUSCULAR; INTRAVENOUS at 07:02

## 2018-08-22 RX ADMIN — DEXAMETHASONE SODIUM PHOSPHATE 4 MG: 4 INJECTION, SOLUTION INTRAMUSCULAR; INTRAVENOUS at 07:02

## 2018-08-22 RX ADMIN — MIDAZOLAM 2 MG: 1 INJECTION INTRAMUSCULAR; INTRAVENOUS at 06:51

## 2018-08-22 RX ADMIN — ONDANSETRON HYDROCHLORIDE 4 MG: 2 SOLUTION INTRAMUSCULAR; INTRAVENOUS at 07:02

## 2018-08-22 RX ADMIN — ROCURONIUM BROMIDE 5 MG: 10 INJECTION INTRAVENOUS at 07:02

## 2018-08-22 RX ADMIN — DEXAMETHASONE SODIUM PHOSPHATE 4 MG: 4 INJECTION, SOLUTION INTRA-ARTICULAR; INTRALESIONAL; INTRAMUSCULAR; INTRAVENOUS; SOFT TISSUE at 06:40

## 2018-08-22 RX ADMIN — PROPOFOL 200 MG: 10 INJECTION, EMULSION INTRAVENOUS at 07:02

## 2018-08-22 RX ADMIN — ACETAMINOPHEN 1000 MG: 500 TABLET, FILM COATED ORAL at 06:40

## 2018-08-22 RX ADMIN — ONDANSETRON 4 MG: 2 INJECTION INTRAMUSCULAR; INTRAVENOUS at 07:59

## 2018-08-22 RX ADMIN — LIDOCAINE HYDROCHLORIDE 100 MG: 20 INJECTION, SOLUTION INFILTRATION; PERINEURAL at 07:02

## 2018-08-22 NOTE — DISCHARGE INSTRUCTIONS

## 2018-08-22 NOTE — ANESTHESIA POSTPROCEDURE EVALUATION
Patient: Lilly Herr    Procedure Summary     Date:  08/22/18 Room / Location:   [facility] OR  /  [facility]    Anesthesia Start:  0658 Anesthesia Stop:  0725    Procedure:  LOOP ELECTROSURGICAL  EXCISION PROCEDURE (N/A Cervix) Diagnosis:  (JEFRY II)    Surgeon:  Lindsey Quispe MD Provider:  Jana Palomo CRNA    Anesthesia Type:  general ASA Status:  1          Anesthesia Type: general  Last vitals  BP   106/51 (08/22/18 0808)   Temp   97.4 °F (36.3 °C) (08/22/18 0755)   Pulse   75 (08/22/18 0808)   Resp   16 (08/22/18 0808)     SpO2   98 % (08/22/18 0808)     Post Anesthesia Care and Evaluation    Patient location during evaluation: PACU  Patient participation: complete - patient participated  Level of consciousness: awake and alert  Pain management: adequate  Airway patency: patent  Anesthetic complications: No anesthetic complications  PONV Status: controlled  Cardiovascular status: acceptable and hemodynamically stable  Respiratory status: acceptable  Hydration status: acceptable    Comments: Patient discharged from PACU prior to anesthesia evaluation based on Ilana Score.  For details, see RN note.     /51   Pulse 75   Temp 97.4 °F (36.3 °C) (Temporal Artery )   Resp 16   SpO2 98%

## 2018-08-22 NOTE — ANESTHESIA PREPROCEDURE EVALUATION
Anesthesia Evaluation     Patient summary reviewed   no history of anesthetic complications:  NPO Solid Status: > 8 hours  NPO Liquid Status: > 8 hours           Airway   Mallampati: II  TM distance: >3 FB  Neck ROM: full  Dental - normal exam     Pulmonary - normal exam    breath sounds clear to auscultation  (-) asthma, recent URI, sleep apnea, not a smoker  Cardiovascular - normal exam  Exercise tolerance: good (4-7 METS)    ECG reviewed  Rhythm: regular  Rate: normal    (-) pacemaker, hypertension, past MI, angina, cardiac stents, CABG      Neuro/Psych  (-) seizures, TIA, CVA  GI/Hepatic/Renal/Endo    (-) GERD, liver disease, no renal disease, diabetes, hypothyroidism, hyperthyroidism    Musculoskeletal     Abdominal    Substance History      OB/GYN          Other                        Anesthesia Plan    ASA 1     general     intravenous induction   Anesthetic plan and risks discussed with patient.

## 2018-08-22 NOTE — ANESTHESIA PROCEDURE NOTES
Airway  Urgency: elective    Airway not difficult    General Information and Staff    Patient location during procedure: OR  CRNA: CARLITA COLE    Indications and Patient Condition  Indications for airway management: airway protection    Preoxygenated: yes  MILS maintained throughout  Mask difficulty assessment: 1 - vent by mask    Final Airway Details  Final airway type: endotracheal airway      Successful airway: ETT  Cuffed: yes   Successful intubation technique: direct laryngoscopy  Facilitating devices/methods: intubating stylet  Endotracheal tube insertion site: oral  Blade: Hall  Blade size: #2  ETT size: 7.0 mm  Cormack-Lehane Classification: grade I - full view of glottis  Placement verified by: chest auscultation, capnometry and palpation of cuff   Cuff volume (mL): 8  Measured from: teeth  ETT to teeth (cm): 20  Number of attempts at approach: 1

## 2018-08-22 NOTE — OP NOTE
Date of Service:  08/22/18  Time of Service:  7:32 AM    Surgical Staff: Surgeon(s) and Role:     * Lindsey Quispe MD - Primary   Additional Staff: none   Pre-operative diagnosis(es): Moderate   Severe Dysplasia     Post-operative diagnosis(es): Moderate Severe Dysplasia   Procedure(s): Procedure(s):  LOOP ELECTROCAUTERY EXCISION PROCEDURE  PARACERVICAL BLOCK       Anesthesia: Type: General  ASA:  ASA status not filed in the log.            Operative findings: Absent staining circumferentially with lugols   Specimens removed: LEEP   Fluid Intake: 500mL   Output: Documented Output  Est. Blood Loss 10mL  Urine Output 25mL          Complications: none   Intraoperative consult(s):    Condition: stable       Disposition: to PACU and then home         Discription of procedure:        After informed consent, the patient was taken to the operating room/procedure room and placed in a modified dorsal lithotomy position in candycane stirrups.  A coated speculum was placed into the vagina and the anterior lip of the cervix grasped with a single tooth tenaculum.      Strong Iodine solution placed on cervix, and the abnormal area visualized at: R. margin.  A 20mm x 10mm Loop used to excise entire abnormal portion.  A second pass was performed to obtain endocervical component.  Endocervical bed cauterized to effect hemostasis.     Next an 18g needle was used to inject  8cc 1% Lidocaine paracervically to aid with postoperative pain.    Sponge, lap & needle counts were correct x 2. The patient tolerated the procedure without complications and was moved to PACU awake for postoperative care.    Leep procedure without complications  Follow up in office in 2 weeks    Lindsey Quispe MD  8/22/2018  7:32 AM

## 2018-08-23 LAB
CYTO UR: NORMAL
LAB AP CASE REPORT: NORMAL
PATH REPORT.FINAL DX SPEC: NORMAL
PATH REPORT.GROSS SPEC: NORMAL

## 2019-01-22 ENCOUNTER — HOSPITAL ENCOUNTER (EMERGENCY)
Facility: HOSPITAL | Age: 24
Discharge: HOME | End: 2019-01-22
Admitting: EMERGENCY MEDICINE

## 2019-01-22 ENCOUNTER — APPOINTMENT (OUTPATIENT)
Dept: GENERAL RADIOLOGY | Facility: HOSPITAL | Age: 24
End: 2019-01-22

## 2019-01-22 VITALS
HEIGHT: 60 IN | RESPIRATION RATE: 14 BRPM | BODY MASS INDEX: 27.17 KG/M2 | WEIGHT: 138.4 LBS | TEMPERATURE: 97.8 F | OXYGEN SATURATION: 98 % | DIASTOLIC BLOOD PRESSURE: 60 MMHG | SYSTOLIC BLOOD PRESSURE: 108 MMHG | HEART RATE: 86 BPM

## 2019-01-22 DIAGNOSIS — K59.00 CONSTIPATION, UNSPECIFIED CONSTIPATION TYPE: Primary | ICD-10-CM

## 2019-01-22 DIAGNOSIS — K29.70 GASTRITIS WITHOUT BLEEDING, UNSPECIFIED CHRONICITY, UNSPECIFIED GASTRITIS TYPE: ICD-10-CM

## 2019-01-22 LAB
ALBUMIN SERPL-MCNC: 4.8 G/DL (ref 3.5–5)
ALBUMIN/GLOB SERPL: 1.5 G/DL (ref 1.1–2.5)
ALP SERPL-CCNC: 57 U/L (ref 24–120)
ALT SERPL W P-5'-P-CCNC: 25 U/L (ref 0–54)
ANION GAP SERPL CALCULATED.3IONS-SCNC: 10 MMOL/L (ref 4–13)
AST SERPL-CCNC: 32 U/L (ref 7–45)
BACTERIA UR QL AUTO: ABNORMAL /HPF
BASOPHILS # BLD AUTO: 0.06 10*3/MM3 (ref 0–0.2)
BASOPHILS NFR BLD AUTO: 0.5 % (ref 0–2)
BILIRUB SERPL-MCNC: 0.7 MG/DL (ref 0.1–1)
BILIRUB UR QL STRIP: NEGATIVE
BUN BLD-MCNC: 11 MG/DL (ref 5–21)
BUN/CREAT SERPL: 21.2 (ref 7–25)
CALCIUM SPEC-SCNC: 9.4 MG/DL (ref 8.4–10.4)
CHLORIDE SERPL-SCNC: 103 MMOL/L (ref 98–110)
CLARITY UR: CLEAR
CO2 SERPL-SCNC: 30 MMOL/L (ref 24–31)
COLOR UR: YELLOW
CREAT BLD-MCNC: 0.52 MG/DL (ref 0.5–1.4)
DEPRECATED RDW RBC AUTO: 37.4 FL (ref 40–54)
EOSINOPHIL # BLD AUTO: 0.05 10*3/MM3 (ref 0–0.7)
EOSINOPHIL NFR BLD AUTO: 0.4 % (ref 0–4)
ERYTHROCYTE [DISTWIDTH] IN BLOOD BY AUTOMATED COUNT: 12 % (ref 12–15)
GFR SERPL CREATININE-BSD FRML MDRD: 145 ML/MIN/1.73
GFR SERPL CREATININE-BSD FRML MDRD: >150 ML/MIN/1.73
GLOBULIN UR ELPH-MCNC: 3.1 GM/DL
GLUCOSE BLD-MCNC: 94 MG/DL (ref 70–100)
GLUCOSE UR STRIP-MCNC: NEGATIVE MG/DL
HCG SERPL QL: NEGATIVE
HCT VFR BLD AUTO: 37 % (ref 37–47)
HGB BLD-MCNC: 12.7 G/DL (ref 12–16)
HGB UR QL STRIP.AUTO: NEGATIVE
HOLD SPECIMEN: NORMAL
HYALINE CASTS UR QL AUTO: ABNORMAL /LPF
IMM GRANULOCYTES # BLD AUTO: 0.03 10*3/MM3 (ref 0–0.03)
IMM GRANULOCYTES NFR BLD AUTO: 0.3 % (ref 0–5)
KETONES UR QL STRIP: NEGATIVE
LEUKOCYTE ESTERASE UR QL STRIP.AUTO: ABNORMAL
LIPASE SERPL-CCNC: 50 U/L (ref 23–203)
LYMPHOCYTES # BLD AUTO: 1.94 10*3/MM3 (ref 0.72–4.86)
LYMPHOCYTES NFR BLD AUTO: 16.6 % (ref 15–45)
MCH RBC QN AUTO: 29.7 PG (ref 28–32)
MCHC RBC AUTO-ENTMCNC: 34.3 G/DL (ref 33–36)
MCV RBC AUTO: 86.4 FL (ref 82–98)
MONOCYTES # BLD AUTO: 0.55 10*3/MM3 (ref 0.19–1.3)
MONOCYTES NFR BLD AUTO: 4.7 % (ref 4–12)
NEUTROPHILS # BLD AUTO: 9.08 10*3/MM3 (ref 1.87–8.4)
NEUTROPHILS NFR BLD AUTO: 77.5 % (ref 39–78)
NITRITE UR QL STRIP: NEGATIVE
NRBC BLD AUTO-RTO: 0 /100 WBC (ref 0–0)
PH UR STRIP.AUTO: 8 [PH] (ref 5–8)
PLATELET # BLD AUTO: 329 10*3/MM3 (ref 130–400)
PMV BLD AUTO: 10.5 FL (ref 6–12)
POTASSIUM BLD-SCNC: 3.6 MMOL/L (ref 3.5–5.3)
PROT SERPL-MCNC: 7.9 G/DL (ref 6.3–8.7)
PROT UR QL STRIP: NEGATIVE
RBC # BLD AUTO: 4.28 10*6/MM3 (ref 4.2–5.4)
RBC # UR: ABNORMAL /HPF
REF LAB TEST METHOD: ABNORMAL
SODIUM BLD-SCNC: 143 MMOL/L (ref 135–145)
SP GR UR STRIP: 1.01 (ref 1–1.03)
SQUAMOUS #/AREA URNS HPF: ABNORMAL /HPF
UROBILINOGEN UR QL STRIP: ABNORMAL
WBC NRBC COR # BLD: 11.71 10*3/MM3 (ref 4.8–10.8)
WBC UR QL AUTO: ABNORMAL /HPF
WHOLE BLOOD HOLD SPECIMEN: NORMAL
WHOLE BLOOD HOLD SPECIMEN: NORMAL

## 2019-01-22 PROCEDURE — 25010000002 ONDANSETRON PER 1 MG: Performed by: PHYSICIAN ASSISTANT

## 2019-01-22 PROCEDURE — 87086 URINE CULTURE/COLONY COUNT: CPT | Performed by: PHYSICIAN ASSISTANT

## 2019-01-22 PROCEDURE — 96374 THER/PROPH/DIAG INJ IV PUSH: CPT

## 2019-01-22 PROCEDURE — 83690 ASSAY OF LIPASE: CPT | Performed by: PHYSICIAN ASSISTANT

## 2019-01-22 PROCEDURE — 80053 COMPREHEN METABOLIC PANEL: CPT | Performed by: PHYSICIAN ASSISTANT

## 2019-01-22 PROCEDURE — 84703 CHORIONIC GONADOTROPIN ASSAY: CPT | Performed by: PHYSICIAN ASSISTANT

## 2019-01-22 PROCEDURE — 85025 COMPLETE CBC W/AUTO DIFF WBC: CPT | Performed by: PHYSICIAN ASSISTANT

## 2019-01-22 PROCEDURE — 81001 URINALYSIS AUTO W/SCOPE: CPT | Performed by: PHYSICIAN ASSISTANT

## 2019-01-22 PROCEDURE — 99283 EMERGENCY DEPT VISIT LOW MDM: CPT

## 2019-01-22 PROCEDURE — 74018 RADEX ABDOMEN 1 VIEW: CPT

## 2019-01-22 RX ORDER — ONDANSETRON 4 MG/1
4 TABLET, ORALLY DISINTEGRATING ORAL EVERY 8 HOURS PRN
Qty: 12 TABLET | Refills: 0 | Status: SHIPPED | OUTPATIENT
Start: 2019-01-22 | End: 2022-05-14

## 2019-01-22 RX ORDER — ONDANSETRON 2 MG/ML
4 INJECTION INTRAMUSCULAR; INTRAVENOUS ONCE
Status: COMPLETED | OUTPATIENT
Start: 2019-01-22 | End: 2019-01-22

## 2019-01-22 RX ORDER — POLYETHYLENE GLYCOL 3350 17 G/17G
17 POWDER, FOR SOLUTION ORAL DAILY
Qty: 119 G | Refills: 0 | Status: SHIPPED | OUTPATIENT
Start: 2019-01-22 | End: 2021-02-03

## 2019-01-22 RX ADMIN — ONDANSETRON 4 MG: 2 SOLUTION INTRAMUSCULAR; INTRAVENOUS at 11:07

## 2019-01-22 RX ADMIN — SODIUM CHLORIDE 1000 ML: 9 INJECTION, SOLUTION INTRAVENOUS at 11:07

## 2019-01-22 NOTE — ED NOTES
Pt provided sprite for PO challenge per PA request prior to discharge.      Stephy Mohamud RN  01/22/19 6686

## 2019-01-22 NOTE — DISCHARGE INSTRUCTIONS
Follow up with one of the [facility] physician groups below to setup primary care. If you have trouble following up, please call the [facility] Nurse Line at [phone]    (Dr. Colten Toure DO, Dr. Tristan Mayes DO,  SOL Garza, and SOL Palomo)  [facility], Primary Care   [address] [address], [address], [place], [place] [id number] [phone]     (Dr. Juana Mayes MD, SOL Hill, and SOL Qiu)  [facility], Primary Care   [address] [address], [place] [address] [phone]    (Dr. Tres Aly MD and Dr. Geovanni Luther MD)  [facility], Primary Care  [address], [address] [phone]    (Dr. Hamzah Asencio MD)  [facility], Primary Care  [address], [address], [place], [place], [address] [phone]

## 2019-01-22 NOTE — ED PROVIDER NOTES
Subjective   History of Present Illness  23-year-old female presents with a chief complaint a 7 day history of intermittent nausea and vomiting.  Patient reports she has abdominal soreness from vomiting so much intermittently.  Patient also reports she has had a 7 day history of constipation.  She reports she is passing gas but has not moved her bowels.  Patient denies vaginal bleeding vaginal discharge fevers chills cough chest pain or shortness of breath.  Review of Systems   All other systems reviewed and are negative.      Past Medical History:   Diagnosis Date   • Migraines    • Urinary tract infection     taking antibiotic       Allergies   Allergen Reactions   • Codeine Angioedema   • Raspberry Rash       Past Surgical History:   Procedure Laterality Date   • APPENDECTOMY     • EYE SURGERY      removed superglue from eye as kid   • LEEP N/A 8/22/2018    Procedure: LOOP ELECTROSURGICAL  EXCISION PROCEDURE;  Surgeon: Lindsey Quispe MD;  Location: [facility] OR;  Service: Obstetrics/Gynecology   • TONSILLECTOMY         History reviewed. No pertinent family history.    Social History     Socioeconomic History   • Marital status: Single     Spouse name: Not on file   • Number of children: Not on file   • Years of education: Not on file   • Highest education level: Not on file   Tobacco Use   • Smoking status: Never Smoker   Substance and Sexual Activity   • Alcohol use: No   • Drug use: No           Objective   Physical Exam   Constitutional: She appears well-developed and well-nourished.   HENT:   Head: Normocephalic and atraumatic.   Eyes: EOM are normal. Pupils are equal, round, and reactive to light. No scleral icterus.   Cardiovascular: Normal rate and regular rhythm.   Pulmonary/Chest: Effort normal and breath sounds normal.   Abdominal: Soft. Normal appearance and bowel sounds are normal. There is no hepatosplenomegaly, splenomegaly or hepatomegaly. There is tenderness in the epigastric area. There is  no rigidity, no rebound, no guarding, no tenderness at McBurney's point and negative Abel's sign. No hernia.   Neurological: She is alert.   Skin: Skin is warm and dry. Capillary refill takes less than 2 seconds.   Psychiatric: She has a normal mood and affect. Her behavior is normal.   Nursing note and vitals reviewed.      Procedures           ED Course          Labs Reviewed   URINALYSIS W/ CULTURE IF INDICATED - Abnormal; Notable for the following components:       Result Value    Leuk Esterase, UA Small (1+) (*)     All other components within normal limits   CBC WITH AUTO DIFFERENTIAL - Abnormal; Notable for the following components:    WBC 11.71 (*)     RDW-SD 37.4 (*)     Neutrophils, Absolute 9.08 (*)     All other components within normal limits   URINALYSIS, MICROSCOPIC ONLY - Abnormal; Notable for the following components:    RBC, UA 0-2 (*)     WBC, UA 6-12 (*)     All other components within normal limits   URINE CULTURE - Normal   LIPASE - Normal   HCG, SERUM, QUALITATIVE - Normal   COMPREHENSIVE METABOLIC PANEL   RAINBOW DRAW    Narrative:     The following orders were created for panel order Taylor Draw.  Procedure                               Abnormality         Status                     ---------                               -----------         ------                     Light Blue Top[[id number]]                                   Final result               Green Top (Gel)[[id number]]                                  Final result               Lavender Top[[id number]]                                     Final result               Red Top[[id number]]                                          Final result               Gold Top - SST[[id number]]                                   Final result                 Please view results for these tests on the individual orders.   CBC AND DIFFERENTIAL    Narrative:     The following orders were created for panel order CBC & Differential.  Procedure              "                  Abnormality         Status                     ---------                               -----------         ------                     CBC Auto Differential[[id number]]        Abnormal            Final result                 Please view results for these tests on the individual orders.   LIGHT BLUE TOP   GREEN TOP   LAVENDER TOP   RED TOP   GOLD TOP - SST     XR Abdomen KUB   Final Result   1. No radiographic evidence of acute abdominopelvic process.    2. Bowel gas pattern is nonobstructive. Moderate stool in the proximal   colon.   This report was finalized on 01/22/2019 11:45 by Dr Cyrus Jenkins, .                MDM  Number of Diagnoses or Management Options  Diagnosis management comments: Workup unremarkable, moderate stool in colon, leukocytosis of 11.  Patient abdomen soft and non-impressive on examination, patient is tolerating PO and is resting comfortable, no vomiting here in emergency room, nausea resolved, describes abdominal pain as a \"soreness\" from vomiting.  Will dc in stable condition and offer strong return precautions        Amount and/or Complexity of Data Reviewed  Clinical lab tests: reviewed and ordered  Tests in the radiology section of CPT®: ordered and reviewed  Tests in the medicine section of CPT®: ordered and reviewed    Risk of Complications, Morbidity, and/or Mortality  Presenting problems: moderate  Diagnostic procedures: moderate  Management options: moderate    Patient Progress  Patient progress: stable        Final diagnoses:   Constipation, unspecified constipation type   Gastritis without bleeding, unspecified chronicity, unspecified gastritis type            John López PA-C  01/23/19 1504    "

## 2019-01-24 LAB — BACTERIA SPEC AEROBE CULT: NORMAL

## 2019-02-04 ENCOUNTER — OFFICE VISIT (OUTPATIENT)
Dept: URGENT CARE | Age: 24
End: 2019-02-04
Payer: MEDICAID

## 2019-02-04 VITALS
RESPIRATION RATE: 16 BRPM | SYSTOLIC BLOOD PRESSURE: 110 MMHG | HEIGHT: 60 IN | BODY MASS INDEX: 27.09 KG/M2 | OXYGEN SATURATION: 98 % | DIASTOLIC BLOOD PRESSURE: 64 MMHG | TEMPERATURE: 98.5 F | WEIGHT: 138 LBS | HEART RATE: 62 BPM

## 2019-02-04 DIAGNOSIS — J02.9 SORE THROAT: ICD-10-CM

## 2019-02-04 DIAGNOSIS — B34.9 VIRAL ILLNESS: Primary | ICD-10-CM

## 2019-02-04 LAB
INFLUENZA A ANTIBODY: NEGATIVE
INFLUENZA B ANTIBODY: NEGATIVE
S PYO AG THROAT QL: NORMAL

## 2019-02-04 PROCEDURE — 87880 STREP A ASSAY W/OPTIC: CPT | Performed by: SPECIALIST

## 2019-02-04 PROCEDURE — 87804 INFLUENZA ASSAY W/OPTIC: CPT | Performed by: SPECIALIST

## 2019-02-04 PROCEDURE — 99213 OFFICE O/P EST LOW 20 MIN: CPT | Performed by: SPECIALIST

## 2019-02-04 ASSESSMENT — ENCOUNTER SYMPTOMS
COUGH: 1
SORE THROAT: 1

## 2019-11-09 ENCOUNTER — OFFICE VISIT (OUTPATIENT)
Dept: URGENT CARE | Age: 24
End: 2019-11-09
Payer: MEDICAID

## 2019-11-09 VITALS
WEIGHT: 122.8 LBS | TEMPERATURE: 98.6 F | BODY MASS INDEX: 23.18 KG/M2 | SYSTOLIC BLOOD PRESSURE: 110 MMHG | OXYGEN SATURATION: 96 % | RESPIRATION RATE: 18 BRPM | DIASTOLIC BLOOD PRESSURE: 69 MMHG | HEART RATE: 80 BPM | HEIGHT: 61 IN

## 2019-11-09 DIAGNOSIS — J01.90 ACUTE SINUSITIS, RECURRENCE NOT SPECIFIED, UNSPECIFIED LOCATION: Primary | ICD-10-CM

## 2019-11-09 DIAGNOSIS — J02.9 SORE THROAT: ICD-10-CM

## 2019-11-09 DIAGNOSIS — R05.9 COUGH: ICD-10-CM

## 2019-11-09 LAB
INFLUENZA A ANTIBODY: NEGATIVE
INFLUENZA B ANTIBODY: NEGATIVE
S PYO AG THROAT QL: NORMAL

## 2019-11-09 PROCEDURE — 87880 STREP A ASSAY W/OPTIC: CPT | Performed by: SPECIALIST

## 2019-11-09 PROCEDURE — 99213 OFFICE O/P EST LOW 20 MIN: CPT | Performed by: SPECIALIST

## 2019-11-09 PROCEDURE — 87804 INFLUENZA ASSAY W/OPTIC: CPT | Performed by: SPECIALIST

## 2019-11-09 RX ORDER — AMOXICILLIN 875 MG/1
875 TABLET, COATED ORAL 2 TIMES DAILY
Qty: 20 TABLET | Refills: 0 | Status: SHIPPED | OUTPATIENT
Start: 2019-11-09 | End: 2019-11-19

## 2019-11-09 RX ORDER — DEXAMETHASONE SODIUM PHOSPHATE 10 MG/ML
5 INJECTION INTRAMUSCULAR; INTRAVENOUS ONCE
Status: COMPLETED | OUTPATIENT
Start: 2019-11-09 | End: 2019-11-09

## 2019-11-09 RX ADMIN — DEXAMETHASONE SODIUM PHOSPHATE 5 MG: 10 INJECTION INTRAMUSCULAR; INTRAVENOUS at 15:32

## 2019-11-09 ASSESSMENT — ENCOUNTER SYMPTOMS
SORE THROAT: 1
SINUS PAIN: 1
SINUS PRESSURE: 1
COUGH: 1

## 2019-12-11 ENCOUNTER — HOSPITAL ENCOUNTER (EMERGENCY)
Age: 24
Discharge: HOME | End: 2019-12-11
Attending: EMERGENCY MEDICINE
Payer: MEDICAID

## 2019-12-11 VITALS
OXYGEN SATURATION: 97 % | RESPIRATION RATE: 16 BRPM | WEIGHT: 130 LBS | SYSTOLIC BLOOD PRESSURE: 104 MMHG | HEIGHT: 60 IN | TEMPERATURE: 98.3 F | DIASTOLIC BLOOD PRESSURE: 62 MMHG | BODY MASS INDEX: 25.52 KG/M2 | HEART RATE: 70 BPM

## 2019-12-11 DIAGNOSIS — K52.9 GASTROENTERITIS: Primary | ICD-10-CM

## 2019-12-11 LAB
ALBUMIN SERPL-MCNC: 4.6 G/DL (ref 3.5–5.2)
ALP BLD-CCNC: 55 U/L (ref 35–104)
ALT SERPL-CCNC: 11 U/L (ref 5–33)
ANION GAP SERPL CALCULATED.3IONS-SCNC: 13 MMOL/L (ref 7–19)
AST SERPL-CCNC: 17 U/L (ref 5–32)
BACTERIA: NEGATIVE /HPF
BASOPHILS ABSOLUTE: 0 K/UL (ref 0–0.2)
BASOPHILS RELATIVE PERCENT: 0.3 % (ref 0–1)
BILIRUB SERPL-MCNC: 0.6 MG/DL (ref 0.2–1.2)
BILIRUBIN URINE: NEGATIVE
BLOOD, URINE: ABNORMAL
BUN BLDV-MCNC: 12 MG/DL (ref 6–20)
CALCIUM SERPL-MCNC: 9.1 MG/DL (ref 8.6–10)
CHLORIDE BLD-SCNC: 101 MMOL/L (ref 98–111)
CLARITY: CLEAR
CO2: 24 MMOL/L (ref 22–29)
COLOR: ABNORMAL
CREAT SERPL-MCNC: 0.5 MG/DL (ref 0.5–0.9)
EOSINOPHILS ABSOLUTE: 0.1 K/UL (ref 0–0.6)
EOSINOPHILS RELATIVE PERCENT: 0.6 % (ref 0–5)
EPITHELIAL CELLS, UA: 3 /HPF (ref 0–5)
GFR NON-AFRICAN AMERICAN: >60
GLUCOSE BLD-MCNC: 86 MG/DL (ref 74–109)
GLUCOSE URINE: NEGATIVE MG/DL
HCG QUALITATIVE: NEGATIVE
HCT VFR BLD CALC: 42 % (ref 37–47)
HEMOGLOBIN: 13.7 G/DL (ref 12–16)
HYALINE CASTS: 7 /HPF (ref 0–8)
IMMATURE GRANULOCYTES #: 0 K/UL
KETONES, URINE: 80 MG/DL
LEUKOCYTE ESTERASE, URINE: NEGATIVE
LYMPHOCYTES ABSOLUTE: 1.2 K/UL (ref 1.1–4.5)
LYMPHOCYTES RELATIVE PERCENT: 13.4 % (ref 20–40)
MAGNESIUM: 2 MG/DL (ref 1.6–2.6)
MCH RBC QN AUTO: 30.2 PG (ref 27–31)
MCHC RBC AUTO-ENTMCNC: 32.6 G/DL (ref 33–37)
MCV RBC AUTO: 92.7 FL (ref 81–99)
MONOCYTES ABSOLUTE: 0.6 K/UL (ref 0–0.9)
MONOCYTES RELATIVE PERCENT: 7.1 % (ref 0–10)
NEUTROPHILS ABSOLUTE: 6.9 K/UL (ref 1.5–7.5)
NEUTROPHILS RELATIVE PERCENT: 78.4 % (ref 50–65)
NITRITE, URINE: NEGATIVE
PDW BLD-RTO: 11.9 % (ref 11.5–14.5)
PH UA: 6 (ref 5–8)
PLATELET # BLD: 272 K/UL (ref 130–400)
PMV BLD AUTO: 10.4 FL (ref 9.4–12.3)
POTASSIUM REFLEX MAGNESIUM: 3.5 MMOL/L (ref 3.5–5)
PROTEIN UA: 30 MG/DL
RBC # BLD: 4.53 M/UL (ref 4.2–5.4)
RBC UA: 3 /HPF (ref 0–4)
SODIUM BLD-SCNC: 138 MMOL/L (ref 136–145)
SPECIFIC GRAVITY UA: 1.04 (ref 1–1.03)
TOTAL PROTEIN: 7.6 G/DL (ref 6.6–8.7)
URINE REFLEX TO CULTURE: ABNORMAL
UROBILINOGEN, URINE: 0.2 E.U./DL
WBC # BLD: 8.8 K/UL (ref 4.8–10.8)
WBC UA: 2 /HPF (ref 0–5)

## 2019-12-11 PROCEDURE — 96375 TX/PRO/DX INJ NEW DRUG ADDON: CPT

## 2019-12-11 PROCEDURE — 84703 CHORIONIC GONADOTROPIN ASSAY: CPT

## 2019-12-11 PROCEDURE — 83735 ASSAY OF MAGNESIUM: CPT

## 2019-12-11 PROCEDURE — 81001 URINALYSIS AUTO W/SCOPE: CPT

## 2019-12-11 PROCEDURE — 2580000003 HC RX 258: Performed by: EMERGENCY MEDICINE

## 2019-12-11 PROCEDURE — 6360000002 HC RX W HCPCS: Performed by: EMERGENCY MEDICINE

## 2019-12-11 PROCEDURE — 80053 COMPREHEN METABOLIC PANEL: CPT

## 2019-12-11 PROCEDURE — 85025 COMPLETE CBC W/AUTO DIFF WBC: CPT

## 2019-12-11 PROCEDURE — 36415 COLL VENOUS BLD VENIPUNCTURE: CPT

## 2019-12-11 PROCEDURE — 96374 THER/PROPH/DIAG INJ IV PUSH: CPT

## 2019-12-11 PROCEDURE — 96361 HYDRATE IV INFUSION ADD-ON: CPT

## 2019-12-11 PROCEDURE — 99283 EMERGENCY DEPT VISIT LOW MDM: CPT

## 2019-12-11 RX ORDER — ONDANSETRON 4 MG/1
4 TABLET, ORALLY DISINTEGRATING ORAL EVERY 8 HOURS PRN
Qty: 15 TABLET | Refills: 0 | Status: SHIPPED | OUTPATIENT
Start: 2019-12-11 | End: 2020-11-12

## 2019-12-11 RX ORDER — 0.9 % SODIUM CHLORIDE 0.9 %
1000 INTRAVENOUS SOLUTION INTRAVENOUS ONCE
Status: COMPLETED | OUTPATIENT
Start: 2019-12-11 | End: 2019-12-11

## 2019-12-11 RX ORDER — KETOROLAC TROMETHAMINE 30 MG/ML
30 INJECTION, SOLUTION INTRAMUSCULAR; INTRAVENOUS ONCE
Status: COMPLETED | OUTPATIENT
Start: 2019-12-11 | End: 2019-12-11

## 2019-12-11 RX ORDER — ONDANSETRON 2 MG/ML
4 INJECTION INTRAMUSCULAR; INTRAVENOUS ONCE
Status: COMPLETED | OUTPATIENT
Start: 2019-12-11 | End: 2019-12-11

## 2019-12-11 RX ADMIN — KETOROLAC TROMETHAMINE 30 MG: 30 INJECTION, SOLUTION INTRAMUSCULAR at 16:34

## 2019-12-11 RX ADMIN — SODIUM CHLORIDE 1000 ML: 9 INJECTION, SOLUTION INTRAVENOUS at 16:34

## 2019-12-11 RX ADMIN — ONDANSETRON 4 MG: 2 INJECTION INTRAMUSCULAR; INTRAVENOUS at 16:34

## 2019-12-11 ASSESSMENT — ENCOUNTER SYMPTOMS
ABDOMINAL PAIN: 1
BACK PAIN: 0
NAUSEA: 1
DIARRHEA: 1
HEMATOCHEZIA: 0
VOMITING: 1

## 2019-12-11 ASSESSMENT — PAIN DESCRIPTION - LOCATION
LOCATION: ABDOMEN
LOCATION: ABDOMEN

## 2019-12-11 ASSESSMENT — PAIN SCALES - GENERAL
PAINLEVEL_OUTOF10: 7
PAINLEVEL_OUTOF10: 7

## 2019-12-11 ASSESSMENT — PAIN DESCRIPTION - PAIN TYPE: TYPE: ACUTE PAIN

## 2019-12-11 ASSESSMENT — PAIN DESCRIPTION - FREQUENCY: FREQUENCY: CONTINUOUS

## 2019-12-11 ASSESSMENT — PAIN DESCRIPTION - DESCRIPTORS: DESCRIPTORS: CRAMPING

## 2020-02-16 ENCOUNTER — OFFICE VISIT (OUTPATIENT)
Dept: URGENT CARE | Age: 25
End: 2020-02-16
Payer: MEDICAID

## 2020-02-16 VITALS
SYSTOLIC BLOOD PRESSURE: 97 MMHG | HEIGHT: 61 IN | RESPIRATION RATE: 16 BRPM | DIASTOLIC BLOOD PRESSURE: 64 MMHG | WEIGHT: 119 LBS | BODY MASS INDEX: 22.47 KG/M2 | TEMPERATURE: 98.9 F | OXYGEN SATURATION: 99 % | HEART RATE: 82 BPM

## 2020-02-16 LAB
APPEARANCE FLUID: NORMAL
BILIRUBIN, POC: NEGATIVE
BLOOD URINE, POC: NEGATIVE
CLARITY, POC: CLEAR
COLOR, POC: YELLOW
GLUCOSE URINE, POC: NEGATIVE
INFLUENZA A ANTIBODY: NEGATIVE
INFLUENZA B ANTIBODY: NEGATIVE
KETONES, POC: NEGATIVE
LEUKOCYTE EST, POC: NEGATIVE
NITRITE, POC: NEGATIVE
PH, POC: 7
PROTEIN, POC: NEGATIVE
S PYO AG THROAT QL: NORMAL
SPECIFIC GRAVITY, POC: 1.02
UROBILINOGEN, POC: 0.2

## 2020-02-16 PROCEDURE — 99214 OFFICE O/P EST MOD 30 MIN: CPT | Performed by: SPECIALIST

## 2020-02-16 PROCEDURE — 87880 STREP A ASSAY W/OPTIC: CPT | Performed by: SPECIALIST

## 2020-02-16 PROCEDURE — 87804 INFLUENZA ASSAY W/OPTIC: CPT | Performed by: SPECIALIST

## 2020-02-16 PROCEDURE — 81002 URINALYSIS NONAUTO W/O SCOPE: CPT | Performed by: SPECIALIST

## 2020-02-16 RX ORDER — AMOXICILLIN 875 MG/1
875 TABLET, COATED ORAL 2 TIMES DAILY
Qty: 20 TABLET | Refills: 0 | Status: SHIPPED | OUTPATIENT
Start: 2020-02-16 | End: 2020-02-26

## 2020-02-16 ASSESSMENT — ENCOUNTER SYMPTOMS
ABDOMINAL PAIN: 1
VOMITING: 0
SORE THROAT: 1
CONSTIPATION: 1
RESPIRATORY NEGATIVE: 1
DIARRHEA: 0

## 2020-02-16 NOTE — PATIENT INSTRUCTIONS
Patient Education        Constipation: Care Instructions  Your Care Instructions    Constipation means that you have a hard time passing stools (bowel movements). People pass stools from 3 times a day to once every 3 days. What is normal for you may be different. Constipation may occur with pain in the rectum and cramping. The pain may get worse when you try to pass stools. Sometimes there are small amounts of bright red blood on toilet paper or the surface of stools. This is because of enlarged veins near the rectum (hemorrhoids). A few changes in your diet and lifestyle may help you avoid ongoing constipation. Your doctor may also prescribe medicine to help loosen your stool. Some medicines can cause constipation. These include pain medicines and antidepressants. Tell your doctor about all the medicines you take. Your doctor may want to make a medicine change to ease your symptoms. Follow-up care is a key part of your treatment and safety. Be sure to make and go to all appointments, and call your doctor if you are having problems. It's also a good idea to know your test results and keep a list of the medicines you take. How can you care for yourself at home? · Drink plenty of fluids, enough so that your urine is light yellow or clear like water. If you have kidney, heart, or liver disease and have to limit fluids, talk with your doctor before you increase the amount of fluids you drink. · Include high-fiber foods in your diet each day. These include fruits, vegetables, beans, and whole grains. · Get at least 30 minutes of exercise on most days of the week. Walking is a good choice. You also may want to do other activities, such as running, swimming, cycling, or playing tennis or team sports. · Take a fiber supplement, such as Citrucel or Metamucil, every day. Read and follow all instructions on the label. · Schedule time each day for a bowel movement. A daily routine may help.  Take your time having your bowel movement. · Support your feet with a small step stool when you sit on the toilet. This helps flex your hips and places your pelvis in a squatting position. · Your doctor may recommend an over-the-counter laxative to relieve your constipation. Examples are Milk of Magnesia and MiraLax. Read and follow all instructions on the label. Do not use laxatives on a long-term basis. When should you call for help? Call your doctor now or seek immediate medical care if:    · You have new or worse belly pain.     · You have new or worse nausea or vomiting.     · You have blood in your stools.    Watch closely for changes in your health, and be sure to contact your doctor if:    · Your constipation is getting worse.     · You do not get better as expected. Where can you learn more? Go to https://Intrinsityelba.[employer]. org and sign in to your [employer] account. Enter [phone] in the [employer] box to learn more about \"Constipation: Care Instructions. \"     If you do not have an account, please click on the \"Sign Up Now\" link. Current as of: June 26, 2019  Content Version: 12.3  © 1524-4319 [employer]. Care instructions adapted under license by [facility] ([facility]). If you have questions about a medical condition or this instruction, always ask your healthcare professional. [place] [address] any warranty or liability for your use of this information. Patient Education        Sinusitis: Care Instructions  Your Care Instructions    Sinusitis is an infection of the lining of the sinus cavities in your head. Sinusitis often follows a cold. It causes pain and pressure in your head and face. In most cases, sinusitis gets better on its own in 1 to 2 weeks. But some mild symptoms may last for several weeks. Sometimes antibiotics are needed. Follow-up care is a key part of your treatment and safety.  Be sure to make and go to all appointments, and call your doctor if you are having contact your doctor if:    · You have new or worse facial pain.     · The mucus from your nose becomes thicker (like pus) or has new blood in it.     · You are not getting better as expected. Where can you learn more? Go to https://chpeprabhaeb.RENTISH. org and sign in to your Coin account. Enter L775 in the OROS box to learn more about \"Sinusitis: Care Instructions. \"     If you do not have an account, please click on the \"Sign Up Now\" link. Current as of: July 28, 2019  Content Version: 12.3  © 2447-0216 Healthwise[employer]. Care instructions adapted under license by [facility] ([facility]). If you have questions about a medical condition or this instruction, always ask your healthcare professional. [address] any warranty or liability for your use of this information. 1.  Start your laxative today  2. Eat fresh fruits and vegetables. 3.  Push water  4. May use over the counter saline nasal spray to moisten nasal passages.   5.  Your your abdominal pain persists or worsens, you may proceed to nearest emergency room

## 2020-02-16 NOTE — PROGRESS NOTES
[address] [facility] URGENT [facility]  [address]  [address] [place] [address]  Dept: [phone]  Loc: [address] is a 21 y.o. female who presents today for her medical conditions/complaintsas noted below. Cameron Paulino is c/o of Congestion (Patient reports symptoms for 3 days ); Pharyngitis; Otalgia (Both); Flank Pain (Right Side/ Patient reports began today feels like cramp ); and Generalized Body Aches        HPI:     Patient states she has a history of constipation. She did have BM today. Patient states, \"It was not a good one. \"    Pharyngitis   This is a new problem. Episode onset: Day 3. The problem occurs constantly. The problem has been gradually worsening. Associated symptoms include abdominal pain, congestion (nasal), fatigue, myalgias and a sore throat. Pertinent negatives include no fever or vomiting. The symptoms are aggravated by swallowing. She has tried nothing for the symptoms. Otalgia    Associated symptoms include abdominal pain and a sore throat. Pertinent negatives include no diarrhea or vomiting. Generalized Body Aches   Associated symptoms include abdominal pain, congestion (nasal), fatigue, myalgias and a sore throat. Pertinent negatives include no fever or vomiting. Abdominal Pain   This is a new problem. The current episode started yesterday. The onset quality is gradual. The problem occurs intermittently. The problem has been waxing and waning. The pain is located in the RLQ. The pain is mild. The quality of the pain is cramping. The abdominal pain does not radiate. Associated symptoms include constipation and myalgias. Pertinent negatives include no diarrhea, fever or vomiting. Nothing aggravates the pain. The pain is relieved by nothing. She has tried nothing for the symptoms. History reviewed. No pertinent past medical history.   Past Surgical History:   Procedure Laterality Date    APPENDECTOMY      LEEP      membranes are moist.      Pharynx: Uvula midline. Posterior oropharyngeal erythema present. Cardiovascular:      Rate and Rhythm: Normal rate and regular rhythm. Heart sounds: Normal heart sounds, S1 normal and S2 normal.   Pulmonary:      Effort: Pulmonary effort is normal.      Breath sounds: Normal breath sounds and air entry. Abdominal:      General: Abdomen is flat. Palpations: Abdomen is soft. Tenderness: There is abdominal tenderness in the right lower quadrant. There is no guarding or rebound. Negative signs include Wise's sign. Neurological:      Mental Status: She is alert. Psychiatric:         Attention and Perception: Attention and perception normal.         Mood and Affect: Mood and affect normal.         Speech: Speech normal.         Behavior: Behavior normal. Behavior is cooperative. BP 97/64   Pulse 82   Temp 98.9 °F (37.2 °C)   Resp 16   Ht 5' 1\" (1.549 m)   Wt 119 lb (54 kg)   LMP 02/09/2020   SpO2 99%   BMI 22.48 kg/m²     ASSESSMENT:       Diagnosis Orders   1. Acute sinusitis, recurrence not specified, unspecified location  amoxicillin (AMOXIL) 875 MG tablet   2. Constipation, unspecified constipation type     3. Flank pain  POCT Urinalysis no Micro   4. Sore throat  POCT Influenza A/B    POCT rapid strep A   5.  Body aches  POCT Influenza A/B    POCT rapid strep A     Results for orders placed or performed in visit on 02/16/20   POCT Influenza A/B   Result Value Ref Range    Influenza A Ab Negative     Influenza B Ab Negative    POCT rapid strep A   Result Value Ref Range    Strep A Ag None Detected None Detected   POCT Urinalysis no Micro   Result Value Ref Range    Color, UA Yellow     Clarity, UA Clear     Glucose, UA POC Negative     Bilirubin, UA Negative     Ketones, UA Negative     Spec Grav, UA 1.025     Blood, UA POC Negative     pH, UA 7.0     Protein, UA POC Negative     Urobilinogen, UA 0.2     Leukocytes, UA Negative     Nitrite, UA Negative can you care for yourself at home? · Drink plenty of fluids, enough so that your urine is light yellow or clear like water. If you have kidney, heart, or liver disease and have to limit fluids, talk with your doctor before you increase the amount of fluids you drink. · Include high-fiber foods in your diet each day. These include fruits, vegetables, beans, and whole grains. · Get at least 30 minutes of exercise on most days of the week. Walking is a good choice. You also may want to do other activities, such as running, swimming, cycling, or playing tennis or team sports. · Take a fiber supplement, such as Citrucel or Metamucil, every day. Read and follow all instructions on the label. · Schedule time each day for a bowel movement. A daily routine may help. Take your time having your bowel movement. · Support your feet with a small step stool when you sit on the toilet. This helps flex your hips and places your pelvis in a squatting position. · Your doctor may recommend an over-the-counter laxative to relieve your constipation. Examples are Milk of Magnesia and MiraLax. Read and follow all instructions on the label. Do not use laxatives on a long-term basis. When should you call for help? Call your doctor now or seek immediate medical care if:    · You have new or worse belly pain.     · You have new or worse nausea or vomiting.     · You have blood in your stools.    Watch closely for changes in your health, and be sure to contact your doctor if:    · Your constipation is getting worse.     · You do not get better as expected. Where can you learn more? Go to https://[place] Networksebla.BuyVIP. org and sign in to your [employer] account. Enter [phone] in the [employer] box to learn more about \"Constipation: Care Instructions. \"     If you do not have an account, please click on the \"Sign Up Now\" link. Current as of: June 26, 2019  Content Version: 12.3  © 8058-6619 Healthwise, [facility].  Care instructions adapted under license by [facility] ([facility]). If you have questions about a medical condition or this instruction, always ask your healthcare professional. [address] any warranty or liability for your use of this information. Patient Education        Sinusitis: Care Instructions  Your Care Instructions    Sinusitis is an infection of the lining of the sinus cavities in your head. Sinusitis often follows a cold. It causes pain and pressure in your head and face. In most cases, sinusitis gets better on its own in 1 to 2 weeks. But some mild symptoms may last for several weeks. Sometimes antibiotics are needed. Follow-up care is a key part of your treatment and safety. Be sure to make and go to all appointments, and call your doctor if you are having problems. It's also a good idea to know your test results and keep a list of the medicines you take. How can you care for yourself at home? · Take an over-the-counter pain medicine, such as acetaminophen (Tylenol), ibuprofen (Advil, Motrin), or naproxen (Aleve). Read and follow all instructions on the label. · If the doctor prescribed antibiotics, take them as directed. Do not stop taking them just because you feel better. You need to take the full course of antibiotics. · Be careful when taking over-the-counter cold or flu medicines and Tylenol at the same time. Many of these medicines have acetaminophen, which is Tylenol. Read the labels to make sure that you are not taking more than the recommended dose. Too much acetaminophen (Tylenol) can be harmful. · Breathe warm, moist air from a steamy shower, a hot bath, or a sink filled with hot water. Avoid cold, dry air. Using a humidifier in your home may help. Follow the directions for cleaning the machine. · Use saline (saltwater) nasal washes to help keep your nasal passages open and wash out mucus and bacteria. You can buy saline nose drops at a grocery store or drugstore.  Or

## 2020-09-25 ENCOUNTER — OFFICE VISIT (OUTPATIENT)
Dept: URGENT CARE | Age: 25
End: 2020-09-25
Payer: MEDICAID

## 2020-09-25 VITALS
HEIGHT: 60 IN | TEMPERATURE: 98.8 F | SYSTOLIC BLOOD PRESSURE: 112 MMHG | DIASTOLIC BLOOD PRESSURE: 72 MMHG | WEIGHT: 119 LBS | HEART RATE: 72 BPM | BODY MASS INDEX: 23.36 KG/M2 | RESPIRATION RATE: 18 BRPM | OXYGEN SATURATION: 99 %

## 2020-09-25 LAB
CONTROL: NORMAL
PREGNANCY TEST URINE, POC: NEGATIVE

## 2020-09-25 PROCEDURE — 81025 URINE PREGNANCY TEST: CPT | Performed by: NURSE PRACTITIONER

## 2020-09-25 PROCEDURE — 99213 OFFICE O/P EST LOW 20 MIN: CPT | Performed by: NURSE PRACTITIONER

## 2020-09-25 RX ORDER — ONDANSETRON 4 MG/1
4 TABLET, ORALLY DISINTEGRATING ORAL ONCE
Status: COMPLETED | OUTPATIENT
Start: 2020-09-25 | End: 2020-09-25

## 2020-09-25 RX ORDER — DEXAMETHASONE SODIUM PHOSPHATE 10 MG/ML
8 INJECTION INTRAMUSCULAR; INTRAVENOUS ONCE
Status: COMPLETED | OUTPATIENT
Start: 2020-09-25 | End: 2020-09-25

## 2020-09-25 RX ORDER — KETOROLAC TROMETHAMINE 30 MG/ML
30 INJECTION, SOLUTION INTRAMUSCULAR; INTRAVENOUS ONCE
Status: COMPLETED | OUTPATIENT
Start: 2020-09-25 | End: 2020-09-25

## 2020-09-25 RX ORDER — ONDANSETRON 4 MG/1
4 TABLET, ORALLY DISINTEGRATING ORAL 3 TIMES DAILY PRN
Qty: 15 TABLET | Refills: 0 | Status: SHIPPED | OUTPATIENT
Start: 2020-09-25 | End: 2020-10-26

## 2020-09-25 RX ADMIN — KETOROLAC TROMETHAMINE 30 MG: 30 INJECTION, SOLUTION INTRAMUSCULAR; INTRAVENOUS at 13:11

## 2020-09-25 RX ADMIN — ONDANSETRON 4 MG: 4 TABLET, ORALLY DISINTEGRATING ORAL at 13:12

## 2020-09-25 RX ADMIN — DEXAMETHASONE SODIUM PHOSPHATE 8 MG: 10 INJECTION INTRAMUSCULAR; INTRAVENOUS at 13:11

## 2020-09-25 ASSESSMENT — ENCOUNTER SYMPTOMS
EYES NEGATIVE: 1
DIARRHEA: 0
WHEEZING: 0
CONSTIPATION: 0
SORE THROAT: 0
CHEST TIGHTNESS: 0
VOMITING: 0
SHORTNESS OF BREATH: 0
NAUSEA: 1

## 2020-09-25 NOTE — PROGRESS NOTES
[address] [facility] URGENT CARE  [address] [place] [address] [place] [address]  Dept: [phone]  Dept Fax: [phone]  Loc: [phone]    Jamey De La Cruz is a 31 y.o. female who presents today for her medical conditions/complaints as noted below. Jamey De La Cruz is c/o of Headache and Nausea        HPI:     Headache    This is a new problem. The current episode started in the past 7 days. The problem occurs daily. The problem has been unchanged. The pain is located in the frontal region. The pain does not radiate. The pain is moderate. Associated symptoms include nausea. Pertinent negatives include no dizziness, fever, numbness, sore throat, vomiting or weakness. Treatments tried: Excedrin migraine. The treatment provided mild relief. Her past medical history is significant for migraine headaches and sinus disease. History reviewed. No pertinent past medical history. Past Surgical History:   Procedure Laterality Date    APPENDECTOMY      LEEP      TONSILLECTOMY         History reviewed. No pertinent family history.     Social History     Tobacco Use    Smoking status: Never Smoker    Smokeless tobacco: Never Used   Substance Use Topics    Alcohol use: No     Comment: occ      Current Outpatient Medications   Medication Sig Dispense Refill    ondansetron (ZOFRAN-ODT) 4 MG disintegrating tablet Place 1 tablet under the tongue 3 times daily as needed for Nausea or Vomiting 15 tablet 0    ondansetron (ZOFRAN ODT) 4 MG disintegrating tablet Take 1 tablet by mouth every 8 hours as needed for Nausea or Vomiting (Patient not taking: Reported on 2/16/2020) 15 tablet 0     Current Facility-Administered Medications   Medication Dose Route Frequency Provider Last Rate Last Dose    ondansetron (ZOFRAN-ODT) disintegrating tablet 4 mg  4 mg Oral Once MYESHA Starr - SERGE         Allergies   Allergen Reactions    Codeine Swelling    Raspberry        Health Maintenance   Topic sounds: Normal heart sounds. No murmur. Pulmonary:      Effort: Pulmonary effort is normal.      Breath sounds: Normal breath sounds. No wheezing or rhonchi. Abdominal:      General: Abdomen is flat. Palpations: Abdomen is soft. Musculoskeletal:         General: No swelling or signs of injury. Lymphadenopathy:      Cervical: No cervical adenopathy. Skin:     General: Skin is warm and dry. Findings: No rash. Neurological:      General: No focal deficit present. Mental Status: She is alert and oriented to person, place, and time. Motor: No weakness. Psychiatric:         Mood and Affect: Mood normal.       /72   Pulse 72   Temp 98.8 °F (37.1 °C) (Oral)   Resp 18   Ht 5' (1.524 m)   Wt 119 lb (54 kg)   LMP 08/28/2020   SpO2 99%   BMI 23.24 kg/m²     Assessment:       Diagnosis Orders   1. Sinus headache  ketorolac (TORADOL) injection 30 mg    dexamethasone (DECADRON) injection 8 mg   2. Right acute serous otitis media, recurrence not specified     3. Sinus congestion     4. Missed menses  POCT urine pregnancy   5. Nausea  ondansetron (ZOFRAN-ODT) disintegrating tablet 4 mg    ondansetron (ZOFRAN-ODT) 4 MG disintegrating tablet       Plan:      Orders Placed This Encounter   Procedures    POCT urine pregnancy     Results for orders placed or performed in visit on 09/25/20   POCT urine pregnancy   Result Value Ref Range    Preg Test, Ur Negative     Control           No follow-ups on file. Orders Placed This Encounter   Medications    ondansetron (ZOFRAN-ODT) disintegrating tablet 4 mg    ketorolac (TORADOL) injection 30 mg    dexamethasone (DECADRON) injection 8 mg    ondansetron (ZOFRAN-ODT) 4 MG disintegrating tablet     Sig: Place 1 tablet under the tongue 3 times daily as needed for Nausea or Vomiting     Dispense:  15 tablet     Refill:  0       Patient given educational materials- see patient instructions.   Discussed use, benefit, and side effects of

## 2020-09-25 NOTE — PATIENT INSTRUCTIONS
1. Toradol shot, Steroid shot, and Zofran given in office. 2. Prescription for ODT Zofran sent to pharmacy. 3. Get over the counter Claritin and start taking daily for the next 3 weeks. 4. When you get home take over the counter Benadryl and drink a bottle of water. Ancelmo Pinch down and rest.   5. If symptoms worsen or do not improve return to UC.

## 2020-10-12 ENCOUNTER — TELEMEDICINE (OUTPATIENT)
Dept: OBGYN | Age: 25
End: 2020-10-12
Payer: MEDICAID

## 2020-10-12 PROCEDURE — 99202 OFFICE O/P NEW SF 15 MIN: CPT | Performed by: OBSTETRICS & GYNECOLOGY

## 2020-10-12 RX ORDER — PNV NO.95/FERROUS FUM/FOLIC AC 28MG-0.8MG
1 TABLET ORAL DAILY
Qty: 30 TABLET | Refills: 11 | Status: SHIPPED | OUTPATIENT
Start: 2020-10-12 | End: 2021-07-14

## 2020-10-12 NOTE — PROGRESS NOTES
10/12/2020    TELEHEALTH EVALUATION -- Audio/Visual (During TQIVN-97 public health emergency)    HPI:    Trung Morales (:  1995) has requested an audio/video evaluation for the following concern(s):    Patient presents with c/o amenorrhea. LMP 2020 5w 6d, [facility] 2021  Pregnancy unplanned but happy. No cramping, spotting, N/V. Review of Systems   Constitutional: Negative. HENT: Negative. Eyes: Negative. Respiratory: Negative. Cardiovascular: Negative. Gastrointestinal: Negative. Endocrine: Negative. Genitourinary: Positive for menstrual problem. Musculoskeletal: Negative. Skin: Negative. Allergic/Immunologic: Negative. Neurological: Negative. Hematological: Negative. Psychiatric/Behavioral: Negative. History reviewed. No pertinent past medical history. Past Surgical History:   Procedure Laterality Date    APPENDECTOMY      EYE SURGERY      LEEP      TONSILLECTOMY         History reviewed. No pertinent family history. Social History     Tobacco Use    Smoking status: Never Smoker    Smokeless tobacco: Never Used   Substance Use Topics    Alcohol use: No     Comment: occ    Drug use: No       Prior to Visit Medications    Medication Sig Taking?  Authorizing Provider   Prenatal Vit-Fe Fumarate-FA (PRENATAL VITAMIN) 27-0.8 MG TABS Take 1 tablet by mouth daily Yes Donis Beal MD   ondansetron (ZOFRAN ODT) 4 MG disintegrating tablet Take 1 tablet by mouth every 8 hours as needed for Nausea or Vomiting  MYESHA Whitley   promethazine (PROMETHEGAN) 25 MG suppository Place 1 suppository rectally every 6 hours as needed for Nausea  MYESHA Whitley   ondansetron (ZOFRAN ODT) 4 MG disintegrating tablet Take 1 tablet by mouth every 8 hours as needed for Nausea or Vomiting  Donis Beal MD   promethazine (PHENERGAN) 25 MG tablet Take 1 tablet by mouth every 6 hours as needed for Nausea  Donis Beal MD ondansetron (ZOFRAN ODT) 4 MG disintegrating tablet Take 1 tablet by mouth every 8 hours as needed for Nausea or Vomiting  Patient not taking: Reported on 2/16/2020  Ky Salinas MD       Allergies   Allergen Reactions    Codeine Swelling    Raspberry              PHYSICAL EXAMINATION  Physical Exam  Constitutional:       General: She is not in acute distress. Appearance: Normal appearance. She is not ill-appearing or diaphoretic. HENT:      Head: Normocephalic and atraumatic. Nose: Nose normal. No rhinorrhea. Eyes:      General: No scleral icterus. Right eye: No discharge. Left eye: No discharge. Extraocular Movements: Extraocular movements intact. Pulmonary:      Effort: Pulmonary effort is normal. No respiratory distress. Musculoskeletal: Normal range of motion. Skin:     Coloration: Skin is not pale. Findings: No erythema or rash. Neurological:      Mental Status: She is alert and oriented to person, place, and time. Psychiatric:         Attention and Perception: Attention and perception normal.         Mood and Affect: Mood and affect normal.         Speech: Speech normal.         Behavior: Behavior normal. Behavior is cooperative. Thought Content: Thought content normal.         Cognition and Memory: Cognition and memory normal.         Judgment: Judgment normal.           ASSESSMENT   Diagnosis Orders   1. Amenorrhea  Prenatal Vit-Fe Fumarate-FA (PRENATAL VITAMIN) 27-0.8 MG TABS    US OB LESS THAN 14 WEEKS SINGLE OR FIRST GESTATION   2. Confirm fetal cardiac activity using ultrasound  US OB LESS THAN 14 WEEKS SINGLE OR FIRST GESTATION       PLAN  1. Dating sono and labs ordered  2. PNV script sent  3. Pregnancy precautions reviewed  4. All questions answered    Olga Renae is a 31 y.o. female being evaluated by a Virtual Visit (video visit) encounter to address concerns as mentioned above. A caregiver was present when appropriate.  Due to this being a TeleHealth encounter (During RIK-17 public health emergency), evaluation of the following organ systems was limited: Vitals/Constitutional/EENT/Resp/CV/GI//MS/Neuro/Skin/Heme-Lymph-Imm. Pursuant to the emergency declaration under the [address] [place] [address] and the [employer] Act, this Virtual Visit was conducted with patient's (and/or legal guardian's) consent, to reduce the patient's risk of exposure to COVID-19 and provide necessary medical care. The patient (and/or legal guardian) has also been advised to contact this office for worsening conditions or problems, and seek emergency medical treatment and/or call 911 if deemed necessary. Patient identification was verified at the start of the visit: Yes    Total time spent on this encounter: Not billed by time    Services were provided through a video synchronous discussion virtually to substitute for in-person clinic visit. Patient and provider were located at their individual homes. --Valerie Dc MD   An electronic signature was used to authenticate this note.

## 2020-10-14 RX ORDER — PROMETHAZINE HYDROCHLORIDE 25 MG/1
25 TABLET ORAL EVERY 6 HOURS PRN
Qty: 60 TABLET | Refills: 3 | Status: SHIPPED | OUTPATIENT
Start: 2020-10-14 | End: 2020-11-13

## 2020-10-21 ENCOUNTER — HOSPITAL ENCOUNTER (EMERGENCY)
Age: 25
Discharge: HOME | End: 2020-10-21
Attending: EMERGENCY MEDICINE
Payer: MEDICAID

## 2020-10-21 VITALS
HEART RATE: 70 BPM | DIASTOLIC BLOOD PRESSURE: 76 MMHG | SYSTOLIC BLOOD PRESSURE: 100 MMHG | HEIGHT: 62 IN | BODY MASS INDEX: 22.08 KG/M2 | OXYGEN SATURATION: 100 % | TEMPERATURE: 98 F | WEIGHT: 120 LBS | RESPIRATION RATE: 18 BRPM

## 2020-10-21 LAB
ALBUMIN SERPL-MCNC: 4.3 G/DL (ref 3.5–5.2)
ALP BLD-CCNC: 39 U/L (ref 35–104)
ALT SERPL-CCNC: 11 U/L (ref 5–33)
ANION GAP SERPL CALCULATED.3IONS-SCNC: 8 MMOL/L (ref 7–19)
AST SERPL-CCNC: 19 U/L (ref 5–32)
BACTERIA: ABNORMAL /HPF
BASOPHILS ABSOLUTE: 0.1 K/UL (ref 0–0.2)
BASOPHILS RELATIVE PERCENT: 0.5 % (ref 0–1)
BILIRUB SERPL-MCNC: 0.4 MG/DL (ref 0.2–1.2)
BILIRUBIN URINE: NEGATIVE
BLOOD, URINE: NEGATIVE
BUN BLDV-MCNC: 8 MG/DL (ref 6–20)
CALCIUM SERPL-MCNC: 8.6 MG/DL (ref 8.6–10)
CHLORIDE BLD-SCNC: 102 MMOL/L (ref 98–111)
CLARITY: CLEAR
CO2: 24 MMOL/L (ref 22–29)
COLOR: YELLOW
CREAT SERPL-MCNC: 0.3 MG/DL (ref 0.5–0.9)
CRYSTALS, UA: ABNORMAL /HPF
EOSINOPHILS ABSOLUTE: 0.1 K/UL (ref 0–0.6)
EOSINOPHILS RELATIVE PERCENT: 0.5 % (ref 0–5)
EPITHELIAL CELLS, UA: 7 /HPF (ref 0–5)
GFR AFRICAN AMERICAN: >59
GFR NON-AFRICAN AMERICAN: >60
GLUCOSE BLD-MCNC: 99 MG/DL (ref 74–109)
GLUCOSE URINE: NEGATIVE MG/DL
GONADOTROPIN, CHORIONIC (HCG) QUANT: ABNORMAL MIU/ML (ref 0–5.3)
HCT VFR BLD CALC: 34.8 % (ref 37–47)
HEMOGLOBIN: 11.9 G/DL (ref 12–16)
HYALINE CASTS: 1 /HPF (ref 0–8)
IMMATURE GRANULOCYTES #: 0 K/UL
KETONES, URINE: 15 MG/DL
LEUKOCYTE ESTERASE, URINE: ABNORMAL
LIPASE: 17 U/L (ref 13–60)
LYMPHOCYTES ABSOLUTE: 2.2 K/UL (ref 1.1–4.5)
LYMPHOCYTES RELATIVE PERCENT: 20.4 % (ref 20–40)
MCH RBC QN AUTO: 30.6 PG (ref 27–31)
MCHC RBC AUTO-ENTMCNC: 34.2 G/DL (ref 33–37)
MCV RBC AUTO: 89.5 FL (ref 81–99)
MONOCYTES ABSOLUTE: 0.9 K/UL (ref 0–0.9)
MONOCYTES RELATIVE PERCENT: 7.7 % (ref 0–10)
NEUTROPHILS ABSOLUTE: 7.8 K/UL (ref 1.5–7.5)
NEUTROPHILS RELATIVE PERCENT: 70.6 % (ref 50–65)
NITRITE, URINE: NEGATIVE
PDW BLD-RTO: 11.6 % (ref 11.5–14.5)
PH UA: 7 (ref 5–8)
PLATELET # BLD: 330 K/UL (ref 130–400)
PMV BLD AUTO: 9.6 FL (ref 9.4–12.3)
POTASSIUM SERPL-SCNC: 4.2 MMOL/L (ref 3.5–5)
PROTEIN UA: NEGATIVE MG/DL
RBC # BLD: 3.89 M/UL (ref 4.2–5.4)
RBC UA: 0 /HPF (ref 0–4)
SODIUM BLD-SCNC: 134 MMOL/L (ref 136–145)
SPECIFIC GRAVITY UA: 1.01 (ref 1–1.03)
TOTAL PROTEIN: 7 G/DL (ref 6.6–8.7)
UROBILINOGEN, URINE: 0.2 E.U./DL
WBC # BLD: 11 K/UL (ref 4.8–10.8)
WBC UA: 5 /HPF (ref 0–5)

## 2020-10-21 PROCEDURE — 80053 COMPREHEN METABOLIC PANEL: CPT

## 2020-10-21 PROCEDURE — 84702 CHORIONIC GONADOTROPIN TEST: CPT

## 2020-10-21 PROCEDURE — 96374 THER/PROPH/DIAG INJ IV PUSH: CPT

## 2020-10-21 PROCEDURE — 99999 PR OFFICE/OUTPT VISIT,PROCEDURE ONLY: CPT | Performed by: EMERGENCY MEDICINE

## 2020-10-21 PROCEDURE — 83690 ASSAY OF LIPASE: CPT

## 2020-10-21 PROCEDURE — 2580000003 HC RX 258: Performed by: EMERGENCY MEDICINE

## 2020-10-21 PROCEDURE — 99282 EMERGENCY DEPT VISIT SF MDM: CPT

## 2020-10-21 PROCEDURE — 81001 URINALYSIS AUTO W/SCOPE: CPT

## 2020-10-21 PROCEDURE — 36415 COLL VENOUS BLD VENIPUNCTURE: CPT

## 2020-10-21 PROCEDURE — 6360000002 HC RX W HCPCS: Performed by: EMERGENCY MEDICINE

## 2020-10-21 PROCEDURE — 85025 COMPLETE CBC W/AUTO DIFF WBC: CPT

## 2020-10-21 RX ORDER — DOXYLAMINE SUCCINATE AND PYRIDOXINE HYDROCHLORIDE, DELAYED RELEASE TABLETS 10 MG/10 MG 10; 10 MG/1; MG/1
TABLET, DELAYED RELEASE ORAL
Qty: 60 TABLET | Refills: 0 | Status: SHIPPED | OUTPATIENT
Start: 2020-10-21 | End: 2020-10-26

## 2020-10-21 RX ORDER — SODIUM CHLORIDE, SODIUM LACTATE, POTASSIUM CHLORIDE, AND CALCIUM CHLORIDE .6; .31; .03; .02 G/100ML; G/100ML; G/100ML; G/100ML
1000 INJECTION, SOLUTION INTRAVENOUS ONCE
Status: COMPLETED | OUTPATIENT
Start: 2020-10-21 | End: 2020-10-21

## 2020-10-21 RX ORDER — ONDANSETRON 2 MG/ML
4 INJECTION INTRAMUSCULAR; INTRAVENOUS ONCE
Status: COMPLETED | OUTPATIENT
Start: 2020-10-21 | End: 2020-10-21

## 2020-10-21 RX ADMIN — SODIUM CHLORIDE, POTASSIUM CHLORIDE, SODIUM LACTATE AND CALCIUM CHLORIDE 1000 ML: 600; 310; 30; 20 INJECTION, SOLUTION INTRAVENOUS at 09:22

## 2020-10-21 RX ADMIN — ONDANSETRON 4 MG: 2 INJECTION INTRAMUSCULAR; INTRAVENOUS at 09:22

## 2020-10-21 ASSESSMENT — ENCOUNTER SYMPTOMS
DIARRHEA: 0
ABDOMINAL PAIN: 0
RHINORRHEA: 0
BACK PAIN: 0
VOMITING: 1
SHORTNESS OF BREATH: 0
NAUSEA: 1
COUGH: 0
SORE THROAT: 0

## 2020-10-21 NOTE — ED PROVIDER NOTES
[facility]  eMERGENCY dEPARTMENT eNCOUnter      Pt Name: Quita Walsh  MRN: [id number]  [place] 1995  Date of evaluation: 10/21/2020  Provider: Bharti Stephens MD    [address] [place] [address]       Chief Complaint   Patient presents with    Emesis     7 WEEKS PREG         HISTORY OF PRESENT ILLNESS   (Location/Symptom, Timing/Onset,Context/Setting, Quality, Duration, Modifying Factors, Severity)  Note limiting factors. Quita Walsh is a 31 y.o. female who presents to the emergency department for nausea and vomiting. She is currently approximately 7 weeks pregnant and is a G3, P2 followed by Dr. Chito Valenzuela. She has had intermittent vomiting for the past week or 2 and difficulty maintaining by mouth intake. She denies any diarrhea or abdominal pain. Tells me she did not have any vomiting issues with her 2 prior pregnancies. No fevers or chills. HPI    NursingNotes were reviewed. REVIEW OF SYSTEMS    (2-9 systems for level 4, 10 or more for level 5)     Review of Systems   Constitutional: Negative for chills and fever. HENT: Negative for rhinorrhea and sore throat. Respiratory: Negative for cough and shortness of breath. Cardiovascular: Negative for chest pain and leg swelling. Gastrointestinal: Positive for nausea and vomiting. Negative for abdominal pain and diarrhea. Genitourinary: Negative for dysuria, frequency, urgency, vaginal bleeding and vaginal discharge. Musculoskeletal: Negative for back pain and neck pain. Neurological: Negative for dizziness and headaches. All other systems reviewed and are negative. PAST MEDICALHISTORY   History reviewed. No pertinent past medical history.       SURGICAL HISTORY       Past Surgical History:   Procedure Laterality Date    APPENDECTOMY      EYE SURGERY      LEEP      TONSILLECTOMY           CURRENT MEDICATIONS     Discharge Medication List as of 10/21/2020 11:09 AM      CONTINUE these medications which have NOT CHANGED Details   promethazine (PHENERGAN) 25 MG tablet Take 1 tablet by mouth every 6 hours as needed for Nausea, Disp-60 tablet,R-3Normal      Prenatal Vit-Fe Fumarate-FA (PRENATAL VITAMIN) 27-0.8 MG TABS Take 1 tablet by mouth daily, Disp-30 tablet,R-11Normal      !! ondansetron (ZOFRAN-ODT) 4 MG disintegrating tablet Place 1 tablet under the tongue 3 times daily as needed for Nausea or Vomiting, Disp-15 tablet,R-0Normal      !! ondansetron (ZOFRAN ODT) 4 MG disintegrating tablet Take 1 tablet by mouth every 8 hours as needed for Nausea or Vomiting, Disp-15 tablet, R-0Print       !! - Potential duplicate medications found. Please discuss with provider. ALLERGIES     Codeine and Raspberry    FAMILY HISTORY     History reviewed. No pertinent family history.        SOCIAL HISTORY       Social History     Socioeconomic History    Marital status: Single     Spouse name: None    Number of children: None    Years of education: None    Highest education level: None   Occupational History    None   Social Needs    Financial resource strain: None    Food insecurity     Worry: None     Inability: None    Transportation needs     Medical: None     Non-medical: None   Tobacco Use    Smoking status: Never Smoker    Smokeless tobacco: Never Used   Substance and Sexual Activity    Alcohol use: No     Comment: occ    Drug use: No    Sexual activity: None   Lifestyle    Physical activity     Days per week: None     Minutes per session: None    Stress: None   Relationships    Social connections     Talks on phone: None     Gets together: None     Attends [employer] service: None     Active member of club or organization: None     Attends meetings of clubs or organizations: None     Relationship status: None    Intimate partner violence     Fear of current or ex partner: None     Emotionally abused: None     Physically abused: None     Forced sexual activity: None   Other Topics Concern    None   Social History Narrative    None       SCREENINGS             PHYSICAL EXAM    (up to 7 for level 4, 8 or more for level 5)     ED Triage Vitals [10/21/20 0911]   BP Temp Temp src Pulse Resp SpO2 Height Weight   98/61 98 °F (36.7 °C) -- 72 20 97 % 5' 2\" (1.575 m) 120 lb (54.4 kg)       Physical Exam  Vitals signs and nursing note reviewed. Constitutional:       General: She is not in acute distress. Appearance: Normal appearance. She is well-developed. HENT:      Head: Normocephalic and atraumatic. Right Ear: External ear normal.      Left Ear: External ear normal.      Mouth/Throat:      Mouth: Mucous membranes are moist.   Eyes:      Conjunctiva/sclera: Conjunctivae normal.   Neck:      Musculoskeletal: Normal range of motion. Trachea: No tracheal deviation. Cardiovascular:      Rate and Rhythm: Normal rate and regular rhythm. Heart sounds: Normal heart sounds. No murmur. Pulmonary:      Effort: No respiratory distress. Breath sounds: Normal breath sounds. No wheezing or rales. Abdominal:      Palpations: Abdomen is soft. There is no mass. Tenderness: There is no abdominal tenderness. Musculoskeletal: Normal range of motion. Skin:     General: Skin is warm and dry. Neurological:      Mental Status: She is alert and oriented to person, place, and time. GCS: GCS eye subscore is 4. GCS verbal subscore is 5. GCS motor subscore is 6.          DIAGNOSTIC RESULTS               No orders to display           LABS:  Labs Reviewed   CBC WITH AUTO DIFFERENTIAL - Abnormal; Notable for the following components:       Result Value    WBC 11.0 (*)     RBC 3.89 (*)     Hemoglobin 11.9 (*)     Hematocrit 34.8 (*)     Neutrophils % 70.6 (*)     Neutrophils Absolute 7.8 (*)     All other components within normal limits   COMPREHENSIVE METABOLIC PANEL - Abnormal; Notable for the following components:    Sodium 134 (*)     CREATININE 0.3 (*)     All other components within normal limits   URINE RT REFLEX TO CULTURE - Abnormal; Notable for the following components:    Ketones, Urine 15 (*)     Leukocyte Esterase, Urine TRACE (*)     All other components within normal limits   HCG, QUANTITATIVE, PREGNANCY - Abnormal; Notable for the following components:    hCG Quant 973907.0 (*)     All other components within normal limits   MICROSCOPIC URINALYSIS - Abnormal; Notable for the following components:    Bacteria, UA TRACE (*)     Crystals, UA NEG (*)     All other components within normal limits   LIPASE       All other labs were within normal range or not returned as of this dictation. EMERGENCY DEPARTMENT COURSE and DIFFERENTIAL DIAGNOSIS/MDM:   Vitals:    Vitals:    10/21/20 0930 10/21/20 1000 10/21/20 1030 10/21/20 1100   BP: (!) 104/57 (!) 101/49 101/65 100/76   Pulse:    70   Resp:    18   Temp:       SpO2: 98% 99% 100% 100%   Weight:       Height:           MDM  Number of Diagnoses or Management Options     Amount and/or Complexity of Data Reviewed  Clinical lab tests: ordered and reviewed      approx 7 weeks pregnant with hyperemesis, no abd pain, no vomiting while here, labs ok, no uti symptoms and likely contaminated, has zofran/phenergan, have given rx for diclegis, understands return precautions      CONSULTS:  None    PROCEDURES:  Unless otherwise noted below, none     Procedures    FINAL IMPRESSION      1. Nausea and vomiting during pregnancy          DISPOSITION/PLAN   DISPOSITION Decision To Discharge 10/21/2020 10:47:46 AM      PATIENT REFERRED TO:  Harmony Stack MD  [address]  [address]  [phone]    Schedule an appointment as soon as possible for a visit in 1 week        DISCHARGE MEDICATIONS:  Discharge Medication List as of 10/21/2020 11:09 AM      START taking these medications    Details   doxylamine-pyridoxine (DICLEGIS) 10-10 MG TBEC Start 2 tabs at night. If symptoms persist after 2 days increase to 1 tab in AM and 2 tabs at night.   May increase further to 1 tab in AM, 1 tab mid afternoon and 2 tabs at night.   Max 4 tabs/day., Disp-60 tablet,R-0Print                (Please note that portions of this note were completed with a voice recognition program.  Efforts were made to edit thedictations but occasionally words are mis-transcribed.)    Darius Johnson MD (electronically signed)  Attending Emergency Physician        Jony La MD  10/21/20 1700

## 2020-10-26 ENCOUNTER — HOSPITAL ENCOUNTER (OUTPATIENT)
Dept: ULTRASOUND IMAGING | Age: 25
Discharge: HOME | End: 2020-10-26
Payer: MEDICAID

## 2020-10-26 ENCOUNTER — HOSPITAL ENCOUNTER (OUTPATIENT)
Age: 25
Setting detail: OBSERVATION
Discharge: HOME | End: 2020-10-27
Attending: OBSTETRICS & GYNECOLOGY | Admitting: OBSTETRICS & GYNECOLOGY
Payer: MEDICAID

## 2020-10-26 ENCOUNTER — INITIAL PRENATAL (OUTPATIENT)
Dept: OBGYN | Age: 25
End: 2020-10-26
Payer: MEDICAID

## 2020-10-26 VITALS
WEIGHT: 113 LBS | BODY MASS INDEX: 20.67 KG/M2 | SYSTOLIC BLOOD PRESSURE: 113 MMHG | HEART RATE: 79 BPM | DIASTOLIC BLOOD PRESSURE: 66 MMHG

## 2020-10-26 PROBLEM — R11.10 HYPEREMESIS: Status: ACTIVE | Noted: 2020-10-26

## 2020-10-26 LAB
ANION GAP SERPL CALCULATED.3IONS-SCNC: 14 MMOL/L (ref 7–19)
BASOPHILS ABSOLUTE: 0 K/UL (ref 0–0.2)
BASOPHILS RELATIVE PERCENT: 0.2 % (ref 0–1)
BUN BLDV-MCNC: 11 MG/DL (ref 6–20)
CALCIUM SERPL-MCNC: 9.6 MG/DL (ref 8.6–10)
CHLORIDE BLD-SCNC: 100 MMOL/L (ref 98–111)
CO2: 22 MMOL/L (ref 22–29)
CREAT SERPL-MCNC: 0.4 MG/DL (ref 0.5–0.9)
EOSINOPHILS ABSOLUTE: 0 K/UL (ref 0–0.6)
EOSINOPHILS RELATIVE PERCENT: 0.1 % (ref 0–5)
GFR AFRICAN AMERICAN: >59
GFR NON-AFRICAN AMERICAN: >60
GLUCOSE BLD-MCNC: 85 MG/DL (ref 74–109)
HCT VFR BLD CALC: 37.9 % (ref 37–47)
HEMOGLOBIN: 13.4 G/DL (ref 12–16)
IMMATURE GRANULOCYTES #: 0.1 K/UL
LYMPHOCYTES ABSOLUTE: 2.2 K/UL (ref 1.1–4.5)
LYMPHOCYTES RELATIVE PERCENT: 15.7 % (ref 20–40)
MCH RBC QN AUTO: 30.9 PG (ref 27–31)
MCHC RBC AUTO-ENTMCNC: 35.4 G/DL (ref 33–37)
MCV RBC AUTO: 87.3 FL (ref 81–99)
MONOCYTES ABSOLUTE: 0.9 K/UL (ref 0–0.9)
MONOCYTES RELATIVE PERCENT: 6.4 % (ref 0–10)
NEUTROPHILS ABSOLUTE: 11 K/UL (ref 1.5–7.5)
NEUTROPHILS RELATIVE PERCENT: 77.1 % (ref 50–65)
PDW BLD-RTO: 11.6 % (ref 11.5–14.5)
PLATELET # BLD: 347 K/UL (ref 130–400)
PMV BLD AUTO: 9.9 FL (ref 9.4–12.3)
POTASSIUM SERPL-SCNC: 3.6 MMOL/L (ref 3.5–5)
RBC # BLD: 4.34 M/UL (ref 4.2–5.4)
SODIUM BLD-SCNC: 136 MMOL/L (ref 136–145)
WBC # BLD: 14.3 K/UL (ref 4.8–10.8)

## 2020-10-26 PROCEDURE — 36415 COLL VENOUS BLD VENIPUNCTURE: CPT

## 2020-10-26 PROCEDURE — 96361 HYDRATE IV INFUSION ADD-ON: CPT

## 2020-10-26 PROCEDURE — 2580000003 HC RX 258: Performed by: OBSTETRICS & GYNECOLOGY

## 2020-10-26 PROCEDURE — 85025 COMPLETE CBC W/AUTO DIFF WBC: CPT

## 2020-10-26 PROCEDURE — 99213 OFFICE O/P EST LOW 20 MIN: CPT | Performed by: OBSTETRICS & GYNECOLOGY

## 2020-10-26 PROCEDURE — 96360 HYDRATION IV INFUSION INIT: CPT

## 2020-10-26 PROCEDURE — 80048 BASIC METABOLIC PNL TOTAL CA: CPT

## 2020-10-26 PROCEDURE — 6370000000 HC RX 637 (ALT 250 FOR IP): Performed by: OBSTETRICS & GYNECOLOGY

## 2020-10-26 PROCEDURE — 6360000002 HC RX W HCPCS: Performed by: OBSTETRICS & GYNECOLOGY

## 2020-10-26 PROCEDURE — 99211 OFF/OP EST MAY X REQ PHY/QHP: CPT

## 2020-10-26 PROCEDURE — 96375 TX/PRO/DX INJ NEW DRUG ADDON: CPT

## 2020-10-26 PROCEDURE — 76801 OB US < 14 WKS SINGLE FETUS: CPT

## 2020-10-26 PROCEDURE — 96374 THER/PROPH/DIAG INJ IV PUSH: CPT

## 2020-10-26 RX ORDER — ONDANSETRON 2 MG/ML
4 INJECTION INTRAMUSCULAR; INTRAVENOUS EVERY 6 HOURS PRN
Status: DISCONTINUED | OUTPATIENT
Start: 2020-10-26 | End: 2020-10-27 | Stop reason: HOSPADM

## 2020-10-26 RX ORDER — SODIUM CHLORIDE, SODIUM LACTATE, POTASSIUM CHLORIDE, AND CALCIUM CHLORIDE .6; .31; .03; .02 G/100ML; G/100ML; G/100ML; G/100ML
2000 INJECTION, SOLUTION INTRAVENOUS ONCE
Status: COMPLETED | OUTPATIENT
Start: 2020-10-26 | End: 2020-10-27

## 2020-10-26 RX ORDER — PROMETHAZINE HYDROCHLORIDE 25 MG/ML
12.5 INJECTION, SOLUTION INTRAMUSCULAR; INTRAVENOUS EVERY 6 HOURS PRN
Status: DISCONTINUED | OUTPATIENT
Start: 2020-10-26 | End: 2020-10-27 | Stop reason: HOSPADM

## 2020-10-26 RX ORDER — SODIUM CHLORIDE, SODIUM LACTATE, POTASSIUM CHLORIDE, CALCIUM CHLORIDE 600; 310; 30; 20 MG/100ML; MG/100ML; MG/100ML; MG/100ML
INJECTION, SOLUTION INTRAVENOUS ONCE
Status: COMPLETED | OUTPATIENT
Start: 2020-10-26 | End: 2020-10-27

## 2020-10-26 RX ORDER — ACETAMINOPHEN 325 MG/1
650 TABLET ORAL EVERY 4 HOURS PRN
Status: DISCONTINUED | OUTPATIENT
Start: 2020-10-26 | End: 2020-10-27 | Stop reason: HOSPADM

## 2020-10-26 RX ADMIN — SODIUM CHLORIDE, POTASSIUM CHLORIDE, SODIUM LACTATE AND CALCIUM CHLORIDE 1000 ML: 600; 310; 30; 20 INJECTION, SOLUTION INTRAVENOUS at 13:35

## 2020-10-26 RX ADMIN — SODIUM CHLORIDE, POTASSIUM CHLORIDE, SODIUM LACTATE AND CALCIUM CHLORIDE: 600; 310; 30; 20 INJECTION, SOLUTION INTRAVENOUS at 20:38

## 2020-10-26 RX ADMIN — ACETAMINOPHEN 650 MG: 325 TABLET ORAL at 17:01

## 2020-10-26 RX ADMIN — ONDANSETRON 4 MG: 2 INJECTION INTRAMUSCULAR; INTRAVENOUS at 14:43

## 2020-10-26 ASSESSMENT — PAIN SCALES - GENERAL: PAINLEVEL_OUTOF10: 6

## 2020-10-26 NOTE — PROGRESS NOTES
Pt is here for prenatal appointment. She denies spotting, contractions. Phenergan knocks her out, she would like to have a refill of zofran. She has had to miss a lot of work due to not being able to hold anything down. She has been having a lot of cramping.

## 2020-10-26 NOTE — PROGRESS NOTES
Yuni Rios is a 31 y.o. female 7w6d who presents for routine prenatal visit. The patient was seen and evaluated. She denies vaginal bleeding or cramping. She does however complain of extreme nausea and vomiting. States she was in the ER last Wednesday where she received fluids. She was unable to keep down one meal but has since not been able to tolerate anything p.o. She is taking sips of water and can keep that down for a very short amount of time. She states she feels very badly today      Damaso Edmond is a 31 y.o. female with the following history as recorded in [facility]:  Patient Active Problem List    Diagnosis Date Noted    Hyperemesis 10/26/2020     Current Outpatient Medications   Medication Sig Dispense Refill    promethazine (PHENERGAN) 25 MG tablet Take 1 tablet by mouth every 6 hours as needed for Nausea 60 tablet 3    Prenatal Vit-Fe Fumarate-FA (PRENATAL VITAMIN) 27-0.8 MG TABS Take 1 tablet by mouth daily 30 tablet 11    ondansetron (ZOFRAN ODT) 4 MG disintegrating tablet Take 1 tablet by mouth every 8 hours as needed for Nausea or Vomiting 40 tablet 5    promethazine (PROMETHEGAN) 25 MG suppository Place 1 suppository rectally every 6 hours as needed for Nausea 10 suppository 2    ondansetron (ZOFRAN ODT) 4 MG disintegrating tablet Take 1 tablet by mouth every 8 hours as needed for Nausea or Vomiting 30 tablet 1    ondansetron (ZOFRAN ODT) 4 MG disintegrating tablet Take 1 tablet by mouth every 8 hours as needed for Nausea or Vomiting (Patient not taking: Reported on 2/16/2020) 15 tablet 0     No current facility-administered medications for this visit. Allergies: Codeine and Raspberry  No past medical history on file. Past Surgical History:   Procedure Laterality Date    APPENDECTOMY      EYE SURGERY      LEEP      TONSILLECTOMY       No family history on file.   Social History     Tobacco Use    Smoking status: Never Smoker    Smokeless tobacco: Never Used   Substance Use Topics    Alcohol use: No     Comment: occ         Mother's Prenatal Vitals  BP: 113/66  Weight: 113 lb (51.3 kg)  Pulse: 79  Patient Position: Sitting  Prenatal Fetal Information  Fetal Heart Rate: US-160  Movement: Absent  Physical Exam  Constitutional:       General: She is not in acute distress. Appearance: Normal appearance. She is ill-appearing. She is not toxic-appearing or diaphoretic. HENT:      Head: Normocephalic and atraumatic. Eyes:      General: No scleral icterus. Right eye: No discharge. Left eye: No discharge. Extraocular Movements: Extraocular movements intact. Pulmonary:      Effort: Pulmonary effort is normal. No respiratory distress. Abdominal:      Palpations: Abdomen is soft. Tenderness: There is no abdominal tenderness. Musculoskeletal: Normal range of motion. General: No swelling or tenderness. Right lower leg: No edema. Left lower leg: No edema. Skin:     General: Skin is warm and dry. Findings: No rash. Neurological:      Mental Status: She is alert and oriented to person, place, and time. Psychiatric:         Attention and Perception: Attention and perception normal.         Mood and Affect: Mood and affect normal.         Speech: Speech normal.         Behavior: Behavior normal. Behavior is cooperative. Thought Content: Thought content normal.         Cognition and Memory: Cognition and memory normal.         Judgment: Judgment normal.             Assessment:   Diagnosis Orders   1. Supervision of other normal pregnancy, antepartum     2. 7 weeks gestation of pregnancy     3. Hyperemesis gravidarum               Plan:  1. SAB precautions   2. Pt sent to L&D for iv fluids observation  3. RTC 2 weeks for weght check. Yessica Mention, am scribing for and in the presence of Dr. Austin Hall.   I, Dr. Austin Hall, personally performed the services described in this documentation as scribed by Ginger Oquendo in my presence, and it is both accurate and complete.

## 2020-10-26 NOTE — FLOWSHEET NOTE
Pt presents from office with c/o severe n/v.  Pt has had weight loss since admission to the er a few days ago.

## 2020-10-27 VITALS
HEART RATE: 79 BPM | TEMPERATURE: 98.6 F | RESPIRATION RATE: 16 BRPM | OXYGEN SATURATION: 99 % | DIASTOLIC BLOOD PRESSURE: 55 MMHG | SYSTOLIC BLOOD PRESSURE: 102 MMHG

## 2020-10-27 PROCEDURE — 96375 TX/PRO/DX INJ NEW DRUG ADDON: CPT

## 2020-10-27 PROCEDURE — 6360000002 HC RX W HCPCS: Performed by: OBSTETRICS & GYNECOLOGY

## 2020-10-27 PROCEDURE — G0378 HOSPITAL OBSERVATION PER HR: HCPCS

## 2020-10-27 PROCEDURE — 2580000003 HC RX 258: Performed by: OBSTETRICS & GYNECOLOGY

## 2020-10-27 PROCEDURE — 96365 THER/PROPH/DIAG IV INF INIT: CPT

## 2020-10-27 PROCEDURE — 96361 HYDRATE IV INFUSION ADD-ON: CPT

## 2020-10-27 PROCEDURE — 2500000003 HC RX 250 WO HCPCS: Performed by: OBSTETRICS & GYNECOLOGY

## 2020-10-27 RX ORDER — ONDANSETRON 4 MG/1
4 TABLET, ORALLY DISINTEGRATING ORAL EVERY 8 HOURS PRN
Qty: 30 TABLET | Refills: 1 | Status: SHIPPED | OUTPATIENT
Start: 2020-10-27 | End: 2020-11-12

## 2020-10-27 RX ORDER — PROMETHAZINE HYDROCHLORIDE 25 MG/1
25 SUPPOSITORY RECTAL EVERY 6 HOURS PRN
Qty: 10 SUPPOSITORY | Refills: 2 | Status: SHIPPED | OUTPATIENT
Start: 2020-10-27 | End: 2020-11-03

## 2020-10-27 RX ORDER — ONDANSETRON 4 MG/1
4 TABLET, ORALLY DISINTEGRATING ORAL EVERY 8 HOURS PRN
Qty: 40 TABLET | Refills: 5 | Status: ON HOLD | OUTPATIENT
Start: 2020-10-27 | End: 2021-06-05 | Stop reason: HOSPADM

## 2020-10-27 RX ADMIN — FOLIC ACID: 5 INJECTION, SOLUTION INTRAMUSCULAR; INTRAVENOUS; SUBCUTANEOUS at 00:38

## 2020-10-27 RX ADMIN — PROMETHAZINE HYDROCHLORIDE 12.5 MG: 25 INJECTION INTRAMUSCULAR; INTRAVENOUS at 00:08

## 2020-10-27 ASSESSMENT — ENCOUNTER SYMPTOMS
EYES NEGATIVE: 1
EYES NEGATIVE: 1
GASTROINTESTINAL NEGATIVE: 1
RESPIRATORY NEGATIVE: 1
NAUSEA: 1
VOMITING: 1
RESPIRATORY NEGATIVE: 1
ALLERGIC/IMMUNOLOGIC NEGATIVE: 1
ALLERGIC/IMMUNOLOGIC NEGATIVE: 1

## 2020-10-27 NOTE — H&P
Subjective:       Guillermo Quiles is a 31 y.o.  at 7 6/7 wks by LMP c/w 7 wk sono who presents with c/o excessive N/V. Patient states she was in the ER last Wednesday and received fluids. She was able to tolerate a small amount of food po after leaving but since then has not kept anything down. She denies vaginal bleeding, discharge or dysuria. She has lost 10 lbs in this time frame. Review of Systems   Constitutional: Positive for fatigue and unexpected weight change. HENT: Negative. Eyes: Negative. Respiratory: Negative. Cardiovascular: Negative. Gastrointestinal: Positive for nausea and vomiting. Endocrine: Negative. Genitourinary: Negative. Musculoskeletal: Negative. Skin: Negative. Allergic/Immunologic: Negative. Neurological: Negative. Hematological: Negative. Psychiatric/Behavioral: Negative. OB History    Para Term  AB Living   3 2 2     2   SAB TAB Ectopic Molar Multiple Live Births             2      # Outcome Date GA Lbr Dom/2nd Weight Sex Delivery Anes PTL Lv   3 Current            2 Term 2014   7 lb 4 oz (3.289 kg) F Vag-Spont None Y BELKYS   1 Term 2009   6 lb 4 oz (2.835 kg) F Vag-Spont EPI N BELKYS       History reviewed. No pertinent past medical history. Past Surgical History:   Procedure Laterality Date    APPENDECTOMY      EYE SURGERY      LEEP      TONSILLECTOMY         History reviewed. No pertinent family history.     Social History     Tobacco Use    Smoking status: Never Smoker    Smokeless tobacco: Never Used   Substance Use Topics    Alcohol use: No     Comment: occ    Drug use: No         Current Facility-Administered Medications:     acetaminophen (TYLENOL) tablet 650 mg, 650 mg, Oral, Q4H PRN, Iliana Knox MD, 650 mg at 10/26/20 1701    ondansetron [facility]) injection 4 mg, 4 mg, Intravenous, Q6H PRN, Iliana Knox MD, 4 mg at 10/26/20 1443    promethazine (PHENERGAN) injection 12.5 mg, 12.5 mg, Intramuscular, Q6H PRN, Lauren Payton MD, 12.5 mg at 10/27/20 0008    Allergies   Allergen Reactions    Codeine Swelling    Raspberry           Objective:       BP (!) 98/58   Pulse 74   Temp 98.9 °F (37.2 °C) (Temporal)   Resp 16   LMP 09/01/2020   SpO2 97%   Physical Exam  Constitutional:       General: She is not in acute distress. Appearance: Normal appearance. She is normal weight. She is not diaphoretic. HENT:      Head: Normocephalic and atraumatic. Eyes:      General: No scleral icterus. Right eye: No discharge. Left eye: No discharge. Extraocular Movements: Extraocular movements intact. Abdominal:      General: There is no distension. Palpations: Abdomen is soft. Tenderness: There is no abdominal tenderness. Musculoskeletal: Normal range of motion. Skin:     General: Skin is warm and dry. Coloration: Skin is pale. Findings: No rash. Neurological:      General: No focal deficit present. Mental Status: She is alert and oriented to person, place, and time. Psychiatric:         Attention and Perception: Attention and perception normal.         Mood and Affect: Mood and affect normal.         Speech: Speech normal.         Behavior: Behavior normal. Behavior is cooperative. Thought Content:  Thought content normal.         Cognition and Memory: Cognition and memory normal.         Judgment: Judgment normal.       Lab Results   Component Value Date    WBC 14.3 (H) 10/26/2020    HGB 13.4 10/26/2020    HCT 37.9 10/26/2020    MCV 87.3 10/26/2020     10/26/2020     Lab Results   Component Value Date     10/26/2020    K 3.6 10/26/2020     10/26/2020    CO2 22 10/26/2020    BUN 11 10/26/2020    CREATININE 0.4 (L) 10/26/2020    GLUCOSE 85 10/26/2020    CALCIUM 9.6 10/26/2020    PROT 7.0 10/21/2020    LABALBU 4.3 10/21/2020    BILITOT 0.4 10/21/2020    ALKPHOS 39 10/21/2020    AST 19 10/21/2020

## 2020-11-06 RX ORDER — DOXYLAMINE SUCCINATE AND PYRIDOXINE HYDROCHLORIDE, DELAYED RELEASE TABLETS 10 MG/10 MG 10; 10 MG/1; MG/1
1 TABLET, DELAYED RELEASE ORAL 2 TIMES DAILY
Qty: 60 TABLET | Refills: 1 | Status: ON HOLD | OUTPATIENT
Start: 2020-11-06 | End: 2021-06-05 | Stop reason: HOSPADM

## 2020-11-12 ENCOUNTER — ROUTINE PRENATAL (OUTPATIENT)
Dept: OBGYN | Age: 25
End: 2020-11-12
Payer: MEDICAID

## 2020-11-12 VITALS
BODY MASS INDEX: 20.85 KG/M2 | DIASTOLIC BLOOD PRESSURE: 67 MMHG | WEIGHT: 114 LBS | SYSTOLIC BLOOD PRESSURE: 105 MMHG | HEART RATE: 80 BPM

## 2020-11-12 DIAGNOSIS — N91.2 AMENORRHEA: ICD-10-CM

## 2020-11-12 LAB
ABO/RH: NORMAL
ANTIBODY SCREEN: NORMAL

## 2020-11-12 PROCEDURE — 99213 OFFICE O/P EST LOW 20 MIN: CPT | Performed by: OBSTETRICS & GYNECOLOGY

## 2020-11-12 NOTE — PROGRESS NOTES
Pt is here for prenatal appointment. She denies spotting, cramping, contractions. She is having mild cramping. Pt has been having some constipation, she hasn't been taking any medication for it. She would like something to help out with this issue. Zeny Rivera is a 31 y.o. female 10w2d who presents for routine prenatal visit. Patient states she is feeling better. Tolerating more po. Has gained 1 lb. No cramping or spotting.    Daphney Stein is a 31 y.o. female with the following history as recorded in [facility]:  Patient Active Problem List    Diagnosis Date Noted    Hyperemesis 10/26/2020     Current Outpatient Medications   Medication Sig Dispense Refill    doxylamine-pyridoxine (DICLEGIS) 10-10 MG TBEC Take 1 tablet by mouth 2 times daily Start taking at night only, if nausea is not relieved, may take morning and night. 60 tablet 1    ondansetron (ZOFRAN ODT) 4 MG disintegrating tablet Take 1 tablet by mouth every 8 hours as needed for Nausea or Vomiting 40 tablet 5    promethazine (PHENERGAN) 25 MG tablet Take 1 tablet by mouth every 6 hours as needed for Nausea 60 tablet 3    Prenatal Vit-Fe Fumarate-FA (PRENATAL VITAMIN) 27-0.8 MG TABS Take 1 tablet by mouth daily 30 tablet 11     No current facility-administered medications for this visit. Allergies: Codeine and Raspberry  No past medical history on file. Past Surgical History:   Procedure Laterality Date    APPENDECTOMY      EYE SURGERY      LEEP      TONSILLECTOMY       No family history on file. Social History     Tobacco Use    Smoking status: Never Smoker    Smokeless tobacco: Never Used   Substance Use Topics    Alcohol use: No     Comment: occ         Mother's Prenatal Vitals  BP: 105/67  Weight: 114 lb (51.7 kg)  Pulse: 80  Patient Position: Sitting  Prenatal Fetal Information  Fetal Heart Rate: US-157  Movement: Absent  Physical Exam  Constitutional:       General: She is not in acute distress. Appearance: Normal appearance. She is not ill-appearing, toxic-appearing or diaphoretic. HENT:      Head: Normocephalic and atraumatic. Eyes:      Extraocular Movements: Extraocular movements intact. Pulmonary:      Effort: Pulmonary effort is normal. No respiratory distress. Abdominal:      Palpations: Abdomen is soft. Tenderness: There is no abdominal tenderness. Musculoskeletal: Normal range of motion. General: No swelling or tenderness. Right lower leg: No edema. Left lower leg: No edema. Skin:     General: Skin is warm and dry. Findings: No rash. Neurological:      Mental Status: She is alert and oriented to person, place, and time. Psychiatric:         Attention and Perception: Attention and perception normal.         Mood and Affect: Mood and affect normal.         Speech: Speech normal.         Behavior: Behavior normal. Behavior is cooperative. Thought Content: Thought content normal.         Cognition and Memory: Cognition and memory normal.         Judgment: Judgment normal.           Assessment:   Diagnosis Orders   1. Supervision of other normal pregnancy, antepartum     2. Hyperemesis gravidarum     3. Amenorrhea  HIV Obstetric Panel    Hemoglobinopathy Eval (Electrophoresis)    Varicella Zoster Antibody, IgG    Type and Screen             Plan:  1. SAB precautions   2. Genetic testing today  3. Continue current meds  4.  Return in 4 weeks

## 2020-11-16 LAB
BASOPHILS ABSOLUTE: 0 K/UL (ref 0–0.2)
BASOPHILS RELATIVE PERCENT: 0.3 % (ref 0–1)
CHLAMYDIA TRACHOMATIS AMPLIFIED DET: NEGATIVE
EOSINOPHILS ABSOLUTE: 0.1 K/UL (ref 0–0.6)
EOSINOPHILS RELATIVE PERCENT: 0.5 % (ref 0–5)
HCT VFR BLD CALC: 34.6 % (ref 37–47)
HEMOGLOBIN A-1 QUANTITATION: 96.5 % (ref 95–97.9)
HEMOGLOBIN A2 QUANTITATION: 3.1 % (ref 2–3.5)
HEMOGLOBIN C QUANTITATION: 0 % (ref 0–0)
HEMOGLOBIN E QUANTITATION: 0 % (ref 0–0)
HEMOGLOBIN ELECTROPHORESIS: NORMAL
HEMOGLOBIN EVALUATION: NORMAL
HEMOGLOBIN F QUANTITATION: 0.4 % (ref 0–2.1)
HEMOGLOBIN OTHER: 0 % (ref 0–0)
HEMOGLOBIN S QUANTITATION: 0 % (ref 0–0)
HEMOGLOBIN: 11.7 G/DL (ref 12–16)
HEPATITIS B SURFACE ANTIGEN INTERPRETATION: ABNORMAL
HIV-1 P24 AG: ABNORMAL
IMMATURE GRANULOCYTES #: 0.1 K/UL
LYMPHOCYTES ABSOLUTE: 1.9 K/UL (ref 1.1–4.5)
LYMPHOCYTES RELATIVE PERCENT: 16.7 % (ref 20–40)
MCH RBC QN AUTO: 30.6 PG (ref 27–31)
MCHC RBC AUTO-ENTMCNC: 33.8 G/DL (ref 33–37)
MCV RBC AUTO: 90.6 FL (ref 81–99)
MONOCYTES ABSOLUTE: 0.6 K/UL (ref 0–0.9)
MONOCYTES RELATIVE PERCENT: 5.3 % (ref 0–10)
N GONORRHOEAE AMPLIFIED DET: NEGATIVE
NEUTROPHILS ABSOLUTE: 8.9 K/UL (ref 1.5–7.5)
NEUTROPHILS RELATIVE PERCENT: 76.8 % (ref 50–65)
PDW BLD-RTO: 12.1 % (ref 11.5–14.5)
PLATELET # BLD: 321 K/UL (ref 130–400)
PMV BLD AUTO: 10.1 FL (ref 9.4–12.3)
RAPID HIV 1&2: ABNORMAL
RBC # BLD: 3.82 M/UL (ref 4.2–5.4)
RPR: ABNORMAL
RUBELLA ANTIBODY IGG: REACTIVE
SICKLE CELL: NORMAL
SPECIMEN SOURCE: NORMAL
WBC # BLD: 11.6 K/UL (ref 4.8–10.8)

## 2020-11-18 LAB — VZV IGG SER QL IA: 1.45

## 2020-12-01 ENCOUNTER — TELEPHONE (OUTPATIENT)
Dept: OBGYN | Age: 25
End: 2020-12-01

## 2020-12-14 ENCOUNTER — TELEMEDICINE (OUTPATIENT)
Dept: OBGYN | Age: 25
End: 2020-12-14
Payer: MEDICAID

## 2020-12-14 PROCEDURE — 99213 OFFICE O/P EST LOW 20 MIN: CPT | Performed by: OBSTETRICS & GYNECOLOGY

## 2020-12-14 NOTE — PROGRESS NOTES
2020    TELEHEALTH EVALUATION -- Audio/Visual (During KSJRO-68 public health emergency)   Reji Anthony is a 31 y.o. female 14w6d who presents for routine prenatal visit. The patient was seen and evaluated. There was negative fetal movements. No contractions, bleeding or leakage of fluid. Signs and symptoms of  labor as well as labor were reviewed. The S/S of Pre-Eclampsia were reviewed with the patient in detail. She is to report any of these if they occur. She currently denies any of these. N/V improved. Chapin Steele is a 31 y.o. female with the following history as recorded in [facility]:  Patient Active Problem List    Diagnosis Date Noted    Hyperemesis 10/26/2020     Current Outpatient Medications   Medication Sig Dispense Refill    potassium chloride (KLOR-CON M) 20 MEQ extended release tablet Take 1 tablet by mouth 2 times daily for 3 days 6 tablet 0    doxylamine-pyridoxine (DICLEGIS) 10-10 MG TBEC Take 1 tablet by mouth 2 times daily Start taking at night only, if nausea is not relieved, may take morning and night. 60 tablet 1    ondansetron (ZOFRAN ODT) 4 MG disintegrating tablet Take 1 tablet by mouth every 8 hours as needed for Nausea or Vomiting 40 tablet 5    Prenatal Vit-Fe Fumarate-FA (PRENATAL VITAMIN) 27-0.8 MG TABS Take 1 tablet by mouth daily 30 tablet 11     No current facility-administered medications for this visit. Allergies: Codeine and Raspberry  No past medical history on file. Past Surgical History:   Procedure Laterality Date    APPENDECTOMY      EYE SURGERY      LEEP      TONSILLECTOMY       No family history on file. Social History     Tobacco Use    Smoking status: Never Smoker    Smokeless tobacco: Never Used   Substance Use Topics    Alcohol use: No     Comment: occ            Physical Exam  Constitutional:       General: She is not in acute distress. Appearance: Normal appearance. She is not ill-appearing or diaphoretic. National Emergencies Act, [facility] waiver authority and the [employer] Act, this Virtual Visit was conducted with patient's (and/or legal guardian's) consent, to reduce the patient's risk of exposure to COVID-19 and provide necessary medical care. The patient (and/or legal guardian) has also been advised to contact this office for worsening conditions or problems, and seek emergency medical treatment and/or call 911 if deemed necessary. Services were provided through a video synchronous discussion virtually to substitute for in-person clinic visit. Patient and provider were located at their individual homes. --Maury Mederos MD   An electronic signature was used to authenticate this note.

## 2020-12-23 ENCOUNTER — APPOINTMENT (OUTPATIENT)
Dept: CT IMAGING | Age: 25
End: 2020-12-23
Payer: MEDICAID

## 2020-12-23 ENCOUNTER — HOSPITAL ENCOUNTER (EMERGENCY)
Age: 25
Discharge: HOME | End: 2020-12-23
Attending: EMERGENCY MEDICINE
Payer: MEDICAID

## 2020-12-23 ENCOUNTER — APPOINTMENT (OUTPATIENT)
Dept: ULTRASOUND IMAGING | Age: 25
End: 2020-12-23
Payer: MEDICAID

## 2020-12-23 VITALS
RESPIRATION RATE: 16 BRPM | HEART RATE: 85 BPM | OXYGEN SATURATION: 100 % | SYSTOLIC BLOOD PRESSURE: 104 MMHG | WEIGHT: 120 LBS | DIASTOLIC BLOOD PRESSURE: 58 MMHG | TEMPERATURE: 98.1 F | HEIGHT: 60 IN | BODY MASS INDEX: 23.56 KG/M2

## 2020-12-23 LAB
ABO/RH: NORMAL
ALBUMIN SERPL-MCNC: 3.9 G/DL (ref 3.5–5.2)
ALP BLD-CCNC: 47 U/L (ref 35–104)
ALT SERPL-CCNC: 24 U/L (ref 5–33)
ANION GAP SERPL CALCULATED.3IONS-SCNC: 10 MMOL/L (ref 7–19)
AST SERPL-CCNC: 28 U/L (ref 5–32)
BASOPHILS ABSOLUTE: 0 K/UL (ref 0–0.2)
BASOPHILS RELATIVE PERCENT: 0.2 % (ref 0–1)
BILIRUB SERPL-MCNC: 0.3 MG/DL (ref 0.2–1.2)
BUN BLDV-MCNC: 10 MG/DL (ref 6–20)
CALCIUM SERPL-MCNC: 8.9 MG/DL (ref 8.6–10)
CHLORIDE BLD-SCNC: 103 MMOL/L (ref 98–111)
CO2: 22 MMOL/L (ref 22–29)
CREAT SERPL-MCNC: 0.3 MG/DL (ref 0.5–0.9)
EOSINOPHILS ABSOLUTE: 0.1 K/UL (ref 0–0.6)
EOSINOPHILS RELATIVE PERCENT: 0.9 % (ref 0–5)
GFR AFRICAN AMERICAN: >59
GFR NON-AFRICAN AMERICAN: >60
GLUCOSE BLD-MCNC: 133 MG/DL (ref 74–109)
HCT VFR BLD CALC: 35.1 % (ref 37–47)
HEMOGLOBIN: 11.6 G/DL (ref 12–16)
IMMATURE GRANULOCYTES #: 0.1 K/UL
LYMPHOCYTES ABSOLUTE: 2 K/UL (ref 1.1–4.5)
LYMPHOCYTES RELATIVE PERCENT: 16.6 % (ref 20–40)
MAGNESIUM: 2 MG/DL (ref 1.6–2.6)
MCH RBC QN AUTO: 31.2 PG (ref 27–31)
MCHC RBC AUTO-ENTMCNC: 33 G/DL (ref 33–37)
MCV RBC AUTO: 94.4 FL (ref 81–99)
MONOCYTES ABSOLUTE: 0.9 K/UL (ref 0–0.9)
MONOCYTES RELATIVE PERCENT: 7.6 % (ref 0–10)
NEUTROPHILS ABSOLUTE: 9 K/UL (ref 1.5–7.5)
NEUTROPHILS RELATIVE PERCENT: 73.8 % (ref 50–65)
PDW BLD-RTO: 12.7 % (ref 11.5–14.5)
PLATELET # BLD: 310 K/UL (ref 130–400)
PMV BLD AUTO: 10.1 FL (ref 9.4–12.3)
POTASSIUM REFLEX MAGNESIUM: 3.2 MMOL/L (ref 3.5–5)
RBC # BLD: 3.72 M/UL (ref 4.2–5.4)
SODIUM BLD-SCNC: 135 MMOL/L (ref 136–145)
TOTAL PROTEIN: 6.5 G/DL (ref 6.6–8.7)
WBC # BLD: 12.2 K/UL (ref 4.8–10.8)

## 2020-12-23 PROCEDURE — 76805 OB US >/= 14 WKS SNGL FETUS: CPT

## 2020-12-23 PROCEDURE — 86901 BLOOD TYPING SEROLOGIC RH(D): CPT

## 2020-12-23 PROCEDURE — 86900 BLOOD TYPING SEROLOGIC ABO: CPT

## 2020-12-23 PROCEDURE — 99283 EMERGENCY DEPT VISIT LOW MDM: CPT

## 2020-12-23 PROCEDURE — 85025 COMPLETE CBC W/AUTO DIFF WBC: CPT

## 2020-12-23 PROCEDURE — 83735 ASSAY OF MAGNESIUM: CPT

## 2020-12-23 PROCEDURE — 70450 CT HEAD/BRAIN W/O DYE: CPT

## 2020-12-23 PROCEDURE — 36415 COLL VENOUS BLD VENIPUNCTURE: CPT

## 2020-12-23 PROCEDURE — 2580000003 HC RX 258: Performed by: PHYSICIAN ASSISTANT

## 2020-12-23 PROCEDURE — 96360 HYDRATION IV INFUSION INIT: CPT

## 2020-12-23 PROCEDURE — 80053 COMPREHEN METABOLIC PANEL: CPT

## 2020-12-23 PROCEDURE — 96361 HYDRATE IV INFUSION ADD-ON: CPT

## 2020-12-23 PROCEDURE — 6370000000 HC RX 637 (ALT 250 FOR IP): Performed by: PHYSICIAN ASSISTANT

## 2020-12-23 PROCEDURE — 72125 CT NECK SPINE W/O DYE: CPT

## 2020-12-23 PROCEDURE — 99999 PR OFFICE/OUTPT VISIT,PROCEDURE ONLY: CPT | Performed by: PHYSICIAN ASSISTANT

## 2020-12-23 RX ORDER — POTASSIUM CHLORIDE 750 MG/1
15 TABLET, EXTENDED RELEASE ORAL ONCE
Status: COMPLETED | OUTPATIENT
Start: 2020-12-23 | End: 2020-12-23

## 2020-12-23 RX ORDER — 0.9 % SODIUM CHLORIDE 0.9 %
1000 INTRAVENOUS SOLUTION INTRAVENOUS ONCE
Status: COMPLETED | OUTPATIENT
Start: 2020-12-23 | End: 2020-12-23

## 2020-12-23 RX ADMIN — POTASSIUM CHLORIDE 15 MEQ: 750 TABLET, EXTENDED RELEASE ORAL at 17:00

## 2020-12-23 RX ADMIN — SODIUM CHLORIDE 1000 ML: 9 INJECTION, SOLUTION INTRAVENOUS at 16:30

## 2020-12-23 ASSESSMENT — ENCOUNTER SYMPTOMS
ABDOMINAL PAIN: 1
BACK PAIN: 0
COUGH: 0
RHINORRHEA: 0
SORE THROAT: 0
NAUSEA: 0
EYE DISCHARGE: 0
ABDOMINAL DISTENTION: 0
COLOR CHANGE: 0
SHORTNESS OF BREATH: 0
EYE PAIN: 0
APNEA: 0
PHOTOPHOBIA: 0

## 2020-12-23 NOTE — ED PROVIDER NOTES
Attending Supervisory Note/Shared Visit    I have reviewed the mid-levels findings and agree. We have discussed the case and reviewed the diagnostic data. I am in agreement with the plan and disposition.     Cosmo Hardin MD  Attending Emergency Physician        Rufus Rinne, MD  12/23/20 4688

## 2020-12-23 NOTE — ED PROVIDER NOTES
[address] [facility] [place] EMERGENCY DEPT  eMERGENCYdEPARTMENT eNCOUnter      Pt Name: Olga Renae  MRN: [id number]  [place] 1995  Date of evaluation: 12/23/2020  Provider:DONNIE Armenta    CHIEF COMPLAINT       Chief Complaint   Patient presents with    Loss of Consciousness     16 weeks pregnant    Head Injury         HISTORY OF PRESENT ILLNESS  (Location/Symptom, Timing/Onset, Context/Setting, Quality, Duration, Modifying Factors, Severity.)   Olga Renae is a 31 y.o. female who presents to the emergency department with complaints of occipital/cranial hematoma post fall due to syncope estimated down for a few seconds and shower she felt lightheaded during shower and woke up laying on her back. She has ambulated since. Mainly complaining of headache after hitting her head and neck pain with full range of motion. She denies any T-spine L-spine she landed on her tailbone as well complaining of pain however she is 16 weeks along her third pregnancy followed by Dr. Lonny Stovall. No prior issues with her pregnancy thus far no prior syncope. She denies any anemia history she denies any fever. No heart history or shortness of breath or pain. First 2 pregnancies were full-term without any issues. No prior history of preeclampsia. She admits to normal intake. Denies drug usage. It is noted that she smells of marijuana. Admits to mild cramping but no significant pain once ultrasound to confirm that the baby is okay. Patient denies any hematuria or spotting. HPI    Nursing Notes were reviewed and I agree. REVIEW OF SYSTEMS    (2-9 systems for level 4, 10 or more for level 5)     Review of Systems   Constitutional: Negative for activity change, appetite change, chills and fever. HENT: Negative for congestion, postnasal drip, rhinorrhea and sore throat. Eyes: Negative for photophobia, pain, discharge and visual disturbance. Respiratory: Negative for apnea, cough and shortness of breath.     Cardiovascular: Negative for chest pain and leg swelling. Gastrointestinal: Positive for abdominal pain. Negative for abdominal distention and nausea. Genitourinary: Negative for vaginal bleeding. Musculoskeletal: Positive for myalgias and neck pain. Negative for arthralgias, back pain, joint swelling and neck stiffness. Skin: Negative for color change and rash. Neurological: Positive for syncope. Negative for dizziness, facial asymmetry and headaches. Hematological: Negative for adenopathy. Does not bruise/bleed easily. Psychiatric/Behavioral: Negative for agitation, behavioral problems and confusion. Except as noted above the remainder of the review of systems was reviewed and negative. PAST MEDICAL HISTORY   No past medical history on file. SURGICAL HISTORY       Past Surgical History:   Procedure Laterality Date    APPENDECTOMY      EYE SURGERY      LEEP      TONSILLECTOMY           CURRENT MEDICATIONS       Discharge Medication List as of 12/23/2020  6:29 PM      CONTINUE these medications which have NOT CHANGED    Details   doxylamine-pyridoxine (DICLEGIS) 10-10 MG TBEC Take 1 tablet by mouth 2 times daily Start taking at night only, if nausea is not relieved, may take morning and night., Disp-60 tablet,R-1Normal      ondansetron (ZOFRAN ODT) 4 MG disintegrating tablet Take 1 tablet by mouth every 8 hours as needed for Nausea or Vomiting, Disp-40 tablet,R-5Normal      Prenatal Vit-Fe Fumarate-FA (PRENATAL VITAMIN) 27-0.8 MG TABS Take 1 tablet by mouth daily, Disp-30 tablet,R-11Normal             ALLERGIES     Codeine and Raspberry    FAMILY HISTORY     No family history on file.        SOCIAL HISTORY       Social History     Socioeconomic History    Marital status: Single     Spouse name: Not on file    Number of children: Not on file    Years of education: Not on file    Highest education level: Not on file   Occupational History    Not on file   Social Needs    Financial resource strain: Not on file    Food insecurity     Worry: Not on file     Inability: Not on file    Transportation needs     Medical: Not on file     Non-medical: Not on file   Tobacco Use    Smoking status: Never Smoker    Smokeless tobacco: Never Used   Substance and Sexual Activity    Alcohol use: No     Comment: occ    Drug use: No    Sexual activity: Not on file   Lifestyle    Physical activity     Days per week: Not on file     Minutes per session: Not on file    Stress: Not on file   Relationships    Social connections     Talks on phone: Not on file     Gets together: Not on file     Attends Bahai service: Not on file     Active member of club or organization: Not on file     Attends meetings of clubs or organizations: Not on file     Relationship status: Not on file    Intimate partner violence     Fear of current or ex partner: Not on file     Emotionally abused: Not on file     Physically abused: Not on file     Forced sexual activity: Not on file   Other Topics Concern    Not on file   Social History Narrative    Not on file       SCREENINGS           PHYSICAL EXAM    (up to 7 forlevel 4, 8 or more for level 5)     ED Triage Vitals [12/23/20 1539]   BP Temp Temp Source Pulse Resp SpO2 Height Weight   104/61 98.1 °F (36.7 °C) Oral 89 16 100 % 5' (1.524 m) 120 lb (54.4 kg)       Physical Exam  Vitals signs and nursing note reviewed. Constitutional:       General: She is not in acute distress. Appearance: Normal appearance. She is well-developed. She is not diaphoretic. HENT:      Head: Normocephalic. Right Ear: Tympanic membrane, ear canal and external ear normal.      Left Ear: Tympanic membrane and external ear normal.      Nose: Nose normal.      Mouth/Throat:      Mouth: Mucous membranes are moist.      Pharynx: No oropharyngeal exudate. Eyes:      General:         Right eye: No discharge. Left eye: No discharge.       Pupils: Pupils are equal, round, and reactive to light.   Neck:      Musculoskeletal: Normal range of motion and neck supple. Thyroid: No thyromegaly. Cardiovascular:      Rate and Rhythm: Normal rate and regular rhythm. Pulses: Normal pulses. Heart sounds: Normal heart sounds. No murmur. No friction rub. Pulmonary:      Effort: Pulmonary effort is normal. No respiratory distress. Breath sounds: Normal breath sounds. No stridor. No wheezing. Abdominal:      General: Abdomen is flat. Bowel sounds are normal. There is no distension. Palpations: Abdomen is soft. Tenderness: There is no abdominal tenderness. Musculoskeletal: Normal range of motion. Arms:    Skin:     General: Skin is warm and dry. Capillary Refill: Capillary refill takes less than 2 seconds. Findings: No rash. Neurological:      General: No focal deficit present. Mental Status: She is alert and oriented to person, place, and time. Cranial Nerves: No cranial nerve deficit. Sensory: No sensory deficit. Motor: No weakness. Coordination: Coordination normal.      Gait: Gait normal.      Deep Tendon Reflexes: Reflexes normal.   Psychiatric:         Mood and Affect: Mood normal.         Behavior: Behavior normal.         Thought Content: Thought content normal.         Judgment: Judgment normal.           DIAGNOSTIC RESULTS     RADIOLOGY:   Non-plain film images such as CT, Ultrasound and MRI are read by the radiologist. Plain radiographic images are visualized and preliminarilyinterpreted by No att. providers found with the below findings:      Interpretation per the Radiologist below, if available at the time of this note:    [place]  [address]   Final Result   1. Second trimester intrauterine pregnancy. 2. Breech positioning. 3. No acute abnormality is seen. IMPRESSION:   1.    Signed by Dr Ael Gómez on 12/23/2020 5:39 PM      CT Cervical Spine WO Contrast   Final Result   No evidence of fracture or acute displacement. The reversal of cervical lordosis may suggest muscular or ligamentous   strain. The Subtle irregularity of the anterior wall of the left foramen   transversarium C7 probably represent an imaging artifact. Signed by Dr Gracia Hoang on 12/23/2020 4:45 PM      CT Head WO Contrast   Final Result   A negative unenhanced CT scan of the head. No skull fracture. Signed by Dr Gracia Hoang on 12/23/2020 4:37 PM          LABS:  Labs Reviewed   CBC WITH AUTO DIFFERENTIAL - Abnormal; Notable for the following components:       Result Value    WBC 12.2 (*)     RBC 3.72 (*)     Hemoglobin 11.6 (*)     Hematocrit 35.1 (*)     MCH 31.2 (*)     Neutrophils % 73.8 (*)     Lymphocytes % 16.6 (*)     Neutrophils Absolute 9.0 (*)     All other components within normal limits   COMPREHENSIVE METABOLIC PANEL W/ REFLEX TO MG FOR LOW K - Abnormal; Notable for the following components:    Sodium 135 (*)     Potassium reflex Magnesium 3.2 (*)     Glucose 133 (*)     CREATININE 0.3 (*)     Total Protein 6.5 (*)     All other components within normal limits   MAGNESIUM   URINE RT REFLEX TO CULTURE   ABO/RH       All other labs were within normal range or notreturned as of this dictation. RE-ASSESSMENT        EMERGENCY DEPARTMENT COURSE and DIFFERENTIAL DIAGNOSIS/MDM:   Vitals:    Vitals:    12/23/20 1539 12/23/20 1604 12/23/20 1712   BP: 104/61  (!) 104/58   Pulse: 89  85   Resp: 16 19 16   Temp: 98.1 °F (36.7 °C)     TempSrc: Oral     SpO2: 100% 99% 100%   Weight: 120 lb (54.4 kg)     Height: 5' (1.524 m)         MDM  Plan for discharge. The patient is feeling much better with fluids she is ambulated is not orthostatic she has slightly low potassium we have given her supplements for here. I would like her to follow closely with her PCP to monitor that she may need to be on supplementation.   She has a normal ultrasound supporting a second trimester intrauterine pregnancy with appropriate fetal heart rate. Plan will be for discharge. PROCEDURES:    Procedures      FINAL IMPRESSION      1. Second trimester pregnancy    2. Normal intrauterine pregnancy on prenatal ultrasound, antepartum    3. Syncope and collapse    4. Hypokalemia          DISPOSITION/PLAN   DISPOSITION Decision To Discharge 12/23/2020 06:28:21 PM      PATIENT REFERRED TO:  [facility] EMERGENCY DEPT  [facility]  [phone]    If symptoms worsen    [facility] [place] [id number] [phone]  Call   to get established follow for hypokalemia tx and hypotension      DISCHARGE MEDICATIONS:  Discharge Medication List as of 12/23/2020  6:29 PM          (Please note that portions of this note were completed with a voice recognition program.  Efforts were made to edit the dictations but occasionallywords are mis-transcribed.)    Arabella Hernandez [address] [place] [address]  12/23/20 4799

## 2020-12-28 RX ORDER — POTASSIUM CHLORIDE 20 MEQ/1
20 TABLET, EXTENDED RELEASE ORAL 2 TIMES DAILY
Qty: 6 TABLET | Refills: 0 | Status: SHIPPED | OUTPATIENT
Start: 2020-12-28 | End: 2021-01-21

## 2020-12-28 RX ORDER — FLUCONAZOLE 150 MG/1
150 TABLET ORAL ONCE
Qty: 1 TABLET | Refills: 0 | Status: SHIPPED | OUTPATIENT
Start: 2020-12-28 | End: 2020-12-28

## 2020-12-29 LAB
EKG P AXIS: 52 DEGREES
EKG P-R INTERVAL: 148 MS
EKG Q-T INTERVAL: 346 MS
EKG QRS DURATION: 72 MS
EKG QTC CALCULATION (BAZETT): 389 MS
EKG T AXIS: 42 DEGREES

## 2021-01-21 ENCOUNTER — TELEMEDICINE (OUTPATIENT)
Dept: OBGYN CLINIC | Age: 26
End: 2021-01-21
Payer: MEDICAID

## 2021-01-21 DIAGNOSIS — Z34.82 ENCOUNTER FOR SUPERVISION OF OTHER NORMAL PREGNANCY IN SECOND TRIMESTER: Primary | ICD-10-CM

## 2021-01-21 DIAGNOSIS — Z3A.20 20 WEEKS GESTATION OF PREGNANCY: ICD-10-CM

## 2021-01-21 PROCEDURE — 99213 OFFICE O/P EST LOW 20 MIN: CPT | Performed by: OBSTETRICS & GYNECOLOGY

## 2021-01-21 NOTE — PROGRESS NOTES
ill-appearing or diaphoretic. HENT:      Head: Normocephalic and atraumatic. Nose: Nose normal. No rhinorrhea. Eyes:      General: No scleral icterus. Right eye: No discharge. Left eye: No discharge. Extraocular Movements: Extraocular movements intact. Pulmonary:      Effort: Pulmonary effort is normal. No respiratory distress. Musculoskeletal: Normal range of motion. Skin:     Coloration: Skin is not pale. Findings: No erythema or rash. Neurological:      Mental Status: She is alert and oriented to person, place, and time. Psychiatric:         Attention and Perception: Attention and perception normal.         Mood and Affect: Mood and affect normal.         Speech: Speech normal.         Behavior: Behavior normal. Behavior is cooperative. Thought Content: Thought content normal.         Cognition and Memory: Cognition and memory normal.         Judgment: Judgment normal.             Assessment:   Diagnosis Orders   1. Encounter for supervision of other normal pregnancy in second trimester     2. 20 weeks gestation of pregnancy               Plan:  1. SAB precautions   2. Anatomy scan WNL  3. Patient advised she could go to dentist as needed  4. Return in 4 weeks    I Leah Blackmon, am scribing for and in the presence of Dr. Rito Griggs. I, Dr. Rito Griggs, personally performed the services described in this documentation as scribed by Leah Blackmon in my presence, and it is both accurate and complete. Selmer Goldmann is a 31 y.o. female being evaluated by a Virtual Visit (video visit) encounter to address concerns as mentioned above. A caregiver was present when appropriate. Due to this being a TeleHealth encounter (During [facility]- public health emergency), evaluation of the following organ systems was limited: Vitals/Constitutional/EENT/Resp/CV/GI//MS/Neuro/Skin/Heme-Lymph-Imm.   Pursuant to the emergency declaration under the [facility] waiver authority and the [employer] Act, this Virtual Visit was conducted with patient's (and/or legal guardian's) consent, to reduce the patient's risk of exposure to COVID-19 and provide necessary medical care. The patient (and/or legal guardian) has also been advised to contact this office for worsening conditions or problems, and seek emergency medical treatment and/or call 911 if deemed necessary. Services were provided through a video synchronous discussion virtually to substitute for in-person clinic visit. Patient and provider were located at their individual homes. --Yvette Henderson MD on 1/21/2021 at 10:52 AM    An electronic signature was used to authenticate this note.

## 2021-01-21 NOTE — PATIENT INSTRUCTIONS
Patient Education        Weeks 18 to 22 of Your Pregnancy: Care Instructions  Your Care Instructions     Your baby is continuing to develop quickly. At this stage, babies can now suck their thumbs,  firmly with their hands, and open and close their eyelids. Sometime between 18 and 22 weeks, you will start to feel your baby move. At first, these small fetal movements feel like fluttering or \"butterflies. \" Some women say that they feel like gas bubbles. As the baby grows, these movements will become stronger. You may also notice that your baby kicks and hiccups. During this time, you may find that your nausea and fatigue are gone. Overall, you may feel better and have more energy than you did in your first trimester. But you may also have new discomforts now, such as sleep problems or leg cramps. This care sheet can help you ease these discomforts. Follow-up care is a key part of your treatment and safety. Be sure to make and go to all appointments, and call your doctor if you are having problems. It's also a good idea to know your test results and keep a list of the medicines you take. How can you care for yourself at home? Ease sleep problems  · Avoid caffeine in drinks or chocolate late in the day. · Get some exercise every day. · Take a warm shower or bath before bed. · Have a light snack or glass of milk at bedtime. · Do relaxation exercises in bed to calm your mind and body. · Support your legs and back with extra pillows. Try a pillow between your legs if you sleep on your side. · Do not use sleeping pills or alcohol. They could harm your baby. Ease leg cramps  · Do not massage your calf during the cramp. · Sit on a firm bed or chair. Straighten your leg, and bend your foot (flex your ankle) slowly upward, toward your knee. Bend your toes up and down. · Stand on a cool, flat surface. Stretch your toes upward, and take small steps walking on your heels.   · Use a heating pad or hot water bottle to help with muscle ache. Prevent leg cramps  · Be sure to get enough calcium. If you are worried that you are not getting enough, talk to your doctor. · Exercise every day, and stretch your legs before bed. · Take a warm bath before bed, and try leg warmers at night. Where can you learn more? Go to https://chpekimberlyewhugo.healthCurate.Us. org and sign in to your [employer] account. Enter W181 in the [employer] box to learn more about \"Weeks 18 to 22 of Your Pregnancy: Care Instructions. \"     If you do not have an account, please click on the \"Sign Up Now\" link. Current as of: February 11, 2020               Content Version: 12.6  © 3368-9006 [employer]. Care instructions adapted under license by [facility] ([facility]). If you have questions about a medical condition or this instruction, always ask your healthcare professional. [address] any warranty or liability for your use of this information.

## 2021-02-03 PROCEDURE — 87636 SARSCOV2 & INF A&B AMP PRB: CPT | Performed by: FAMILY MEDICINE

## 2021-02-15 ENCOUNTER — TELEMEDICINE (OUTPATIENT)
Dept: OBGYN CLINIC | Age: 26
End: 2021-02-15
Payer: MEDICAID

## 2021-02-15 DIAGNOSIS — O47.00 PRETERM CONTRACTIONS: ICD-10-CM

## 2021-02-15 DIAGNOSIS — Z3A.23 23 WEEKS GESTATION OF PREGNANCY: ICD-10-CM

## 2021-02-15 DIAGNOSIS — Z34.82 SUPERVISION OF NORMAL INTRAUTERINE PREGNANCY IN MULTIGRAVIDA IN SECOND TRIMESTER: Primary | ICD-10-CM

## 2021-02-15 PROCEDURE — 99213 OFFICE O/P EST LOW 20 MIN: CPT | Performed by: OBSTETRICS & GYNECOLOGY

## 2021-02-15 NOTE — PROGRESS NOTES
2/15/2021    TELEHEALTH EVALUATION -- Audio/Visual (During ZHBHZ-07 public health emergency)   Chitra Tai is a 31 y.o. female 23w6d who presents for routine prenatal visit. The patient was seen and evaluated. There was normal fetal movements. Complains of contractions over the past week. Has not timed them. Feels that they are frequent. Drinking 2 bottles of water daily. Denies vaginal discharge. No bleeding or leakage of fluid. Signs and symptoms of  labor as well as labor were reviewed. The S/S of Pre-Eclampsia were reviewed with the patient in detail. She is to report any of these if they occur. She currently denies any of these. Has also had some vomiting this past week. Nguyen Gupta is a 31 y.o. female with the following history as recorded in [facility]:  Patient Active Problem List    Diagnosis Date Noted    Hyperemesis 10/26/2020     Current Outpatient Medications   Medication Sig Dispense Refill    doxylamine-pyridoxine (DICLEGIS) 10-10 MG TBEC Take 1 tablet by mouth 2 times daily Start taking at night only, if nausea is not relieved, may take morning and night. 60 tablet 1    ondansetron (ZOFRAN ODT) 4 MG disintegrating tablet Take 1 tablet by mouth every 8 hours as needed for Nausea or Vomiting 40 tablet 5    Prenatal Vit-Fe Fumarate-FA (PRENATAL VITAMIN) 27-0.8 MG TABS Take 1 tablet by mouth daily 30 tablet 11     No current facility-administered medications for this visit. Allergies: Codeine and Raspberry  No past medical history on file. Past Surgical History:   Procedure Laterality Date    APPENDECTOMY      EYE SURGERY      LEEP      TONSILLECTOMY       No family history on file. Social History     Tobacco Use    Smoking status: Never Smoker    Smokeless tobacco: Never Used   Substance Use Topics    Alcohol use: No     Comment: occ            Physical Exam  Constitutional:       General: She is not in acute distress.      Appearance: Normal appearance. She is not ill-appearing or diaphoretic. HENT:      Head: Normocephalic and atraumatic. Nose: Nose normal. No rhinorrhea. Eyes:      General: No scleral icterus. Right eye: No discharge. Left eye: No discharge. Extraocular Movements: Extraocular movements intact. Pulmonary:      Effort: Pulmonary effort is normal. No respiratory distress. Musculoskeletal: Normal range of motion. Skin:     Coloration: Skin is not pale. Findings: No erythema or rash. Neurological:      Mental Status: She is alert and oriented to person, place, and time. Psychiatric:         Attention and Perception: Attention and perception normal.         Mood and Affect: Mood and affect normal.         Speech: Speech normal.         Behavior: Behavior normal. Behavior is cooperative. Thought Content: Thought content normal.         Cognition and Memory: Cognition and memory normal.         Judgment: Judgment normal.           The patient had her 28 week labs ordered. Assessment:   Diagnosis Orders   1. Supervision of normal intrauterine pregnancy in multigravida in second trimester  Glucose 1 Hour Postprandial    CBC   2. 23 weeks gestation of pregnancy     3.  contractions               Plan:  1. PTL precautions. Patient advised to time pain. If regular instructed to go to L&D  2. Increase water intake. Goal of 100 oz. 3.  Return in 2 weeks                        Shayla Waters is a 31 y.o. female being evaluated by a Virtual Visit (video visit) encounter to address concerns as mentioned above. A caregiver was present when appropriate. Due to this being a TeleHealth encounter (During [place] [address] public health emergency), evaluation of the following organ systems was limited: Vitals/Constitutional/EENT/Resp/CV/GI//MS/Neuro/Skin/Heme-Lymph-Imm.   Pursuant to the emergency declaration under the [facility]5 waiver authority and the Coronavirus Preparedness and Response Supplemental Appropriations Act, this Virtual Visit was conducted with patient's (and/or legal guardian's) consent, to reduce the patient's risk of exposure to COVID-19 and provide necessary medical care. The patient (and/or legal guardian) has also been advised to contact this office for worsening conditions or problems, and seek emergency medical treatment and/or call 911 if deemed necessary. Services were provided through a video synchronous discussion virtually to substitute for in-person clinic visit. Patient and provider were located at their individual homes. --Chela Diaz MD on 2/15/2021 at 11:28 AM    An electronic signature was used to authenticate this note.

## 2021-03-04 ENCOUNTER — ROUTINE PRENATAL (OUTPATIENT)
Dept: OBGYN CLINIC | Age: 26
End: 2021-03-04
Payer: MEDICAID

## 2021-03-04 VITALS
WEIGHT: 125 LBS | BODY MASS INDEX: 24.41 KG/M2 | DIASTOLIC BLOOD PRESSURE: 59 MMHG | HEART RATE: 77 BPM | SYSTOLIC BLOOD PRESSURE: 102 MMHG

## 2021-03-04 DIAGNOSIS — Z3A.26 26 WEEKS GESTATION OF PREGNANCY: ICD-10-CM

## 2021-03-04 DIAGNOSIS — Z34.82 SUPERVISION OF NORMAL INTRAUTERINE PREGNANCY IN MULTIGRAVIDA IN SECOND TRIMESTER: Primary | ICD-10-CM

## 2021-03-04 DIAGNOSIS — Z23 NEED FOR TDAP VACCINATION: ICD-10-CM

## 2021-03-04 DIAGNOSIS — Z34.82 SUPERVISION OF NORMAL INTRAUTERINE PREGNANCY IN MULTIGRAVIDA IN SECOND TRIMESTER: ICD-10-CM

## 2021-03-04 LAB
GLUCOSE, 1HR PP: 103 MG/DL (ref 75–140)
HCT VFR BLD CALC: 33.2 % (ref 37–47)
HEMOGLOBIN: 11.3 G/DL (ref 12–16)
MCH RBC QN AUTO: 31.7 PG (ref 27–31)
MCHC RBC AUTO-ENTMCNC: 34 G/DL (ref 33–37)
MCV RBC AUTO: 93.3 FL (ref 81–99)
PDW BLD-RTO: 12.1 % (ref 11.5–14.5)
PLATELET # BLD: 279 K/UL (ref 130–400)
PMV BLD AUTO: 10.5 FL (ref 9.4–12.3)
RBC # BLD: 3.56 M/UL (ref 4.2–5.4)
WBC # BLD: 11.8 K/UL (ref 4.8–10.8)

## 2021-03-04 PROCEDURE — 99213 OFFICE O/P EST LOW 20 MIN: CPT | Performed by: OBSTETRICS & GYNECOLOGY

## 2021-03-04 NOTE — PROGRESS NOTES
Pt is here for prenatal appointment. She denies spotting, cramping, contractions. After obtaining consent, and per orders of Dr. Sherrye Homans, injection of Boostrix given in Right deltoid by Angela Velasquez. Patient instructed to remain in clinic for 20 minutes afterwards, and to report any adverse reaction to me immediately. Isaias Roy is a 31 y.o. female  who presents for routine prenatal visit. The patient was seen and evaluated. There was normal fetal movements. No contractions, bleeding or leakage of fluid. Signs and symptoms of  labor as well as labor were reviewed. The S/S of Pre-Eclampsia were reviewed with the patient in detail. She is to report any of these if they occur. She currently denies any of these. Tatyana Ortega is a 31 y.o. female with the following history as recorded in [facility]:  Patient Active Problem List    Diagnosis Date Noted    Hyperemesis 10/26/2020     Current Outpatient Medications   Medication Sig Dispense Refill    Prenatal Vit-Fe Fumarate-FA (PRENATAL VITAMIN) 27-0.8 MG TABS Take 1 tablet by mouth daily 30 tablet 11    pantoprazole (PROTONIX) 40 MG tablet Take 1 tablet by mouth every morning (before breakfast) 30 tablet 2    doxylamine-pyridoxine (DICLEGIS) 10-10 MG TBEC Take 1 tablet by mouth 2 times daily Start taking at night only, if nausea is not relieved, may take morning and night. (Patient not taking: Reported on 3/4/2021) 60 tablet 1    ondansetron (ZOFRAN ODT) 4 MG disintegrating tablet Take 1 tablet by mouth every 8 hours as needed for Nausea or Vomiting (Patient not taking: Reported on 3/4/2021) 40 tablet 5     No current facility-administered medications for this visit. Allergies: Codeine and Raspberry  History reviewed. No pertinent past medical history. Past Surgical History:   Procedure Laterality Date    APPENDECTOMY      EYE SURGERY      LEEP      TONSILLECTOMY       History reviewed.  No pertinent family history. Social History     Tobacco Use    Smoking status: Never Smoker    Smokeless tobacco: Never Used   Substance Use Topics    Alcohol use: No     Comment: occ         Mother's Prenatal Vitals  BP: (!) 102/59  Weight: 125 lb (56.7 kg)  Pulse: 77  Patient Position: Sitting  Prenatal Fetal Information  Fundal Height (cm): 27 cm  Fetal Heart Rate: US-169   Movement: Present  Physical Exam  Constitutional:       General: She is not in acute distress. Appearance: Normal appearance. She is not ill-appearing, toxic-appearing or diaphoretic. HENT:      Head: Normocephalic and atraumatic. Eyes:      Extraocular Movements: Extraocular movements intact. Pulmonary:      Effort: Pulmonary effort is normal. No respiratory distress. Abdominal:      Palpations: Abdomen is soft. Tenderness: There is no abdominal tenderness. Musculoskeletal: Normal range of motion. General: No swelling or tenderness. Right lower leg: No edema. Left lower leg: No edema. Skin:     General: Skin is warm and dry. Findings: No rash. Neurological:      Mental Status: She is alert and oriented to person, place, and time. Psychiatric:         Attention and Perception: Attention and perception normal.         Mood and Affect: Mood and affect normal.         Speech: Speech normal.         Behavior: Behavior normal. Behavior is cooperative. Thought Content: Thought content normal.         Cognition and Memory: Cognition and memory normal.         Judgment: Judgment normal.           The patient had her 28 week labs in process. T-Dap Vaccine (27-36 weeks): completed    The patient was instructed on fetal kick counts and was given a kick sheet to complete every 8 hours.  She was instructed that the baby should move at a minimum of ten times within one hour after a meal. The patient was instructed to lay down on her left side twenty minutes after eating and count movements for up to one hour with a target value of ten movements. She was instructed to notify the office if she did not make that target after two attempts or if after any attempt there was less than four movements. The patient reports that the targets have been made Yes. Assessment:   Diagnosis Orders   1. Supervision of normal intrauterine pregnancy in multigravida in second trimester     2. 26 weeks gestation of pregnancy     3. Need for Tdap vaccination  Tetanus-Diphth-Acell Pertussis (BOOSTRIX) injection 0.5 mL             Plan:  1. PTL precautions   2. RGS, CBC today  3.  Return in 3 weeks

## 2021-03-08 DIAGNOSIS — O26.892 HEARTBURN DURING PREGNANCY IN SECOND TRIMESTER: Primary | ICD-10-CM

## 2021-03-08 DIAGNOSIS — R12 HEARTBURN DURING PREGNANCY IN SECOND TRIMESTER: Primary | ICD-10-CM

## 2021-03-08 RX ORDER — PANTOPRAZOLE SODIUM 40 MG/1
40 TABLET, DELAYED RELEASE ORAL
Qty: 30 TABLET | Refills: 2 | Status: ON HOLD | OUTPATIENT
Start: 2021-03-08 | End: 2021-06-05 | Stop reason: HOSPADM

## 2021-04-01 ENCOUNTER — ROUTINE PRENATAL (OUTPATIENT)
Dept: OBGYN CLINIC | Age: 26
End: 2021-04-01
Payer: MEDICAID

## 2021-04-01 VITALS
DIASTOLIC BLOOD PRESSURE: 64 MMHG | HEART RATE: 81 BPM | SYSTOLIC BLOOD PRESSURE: 111 MMHG | BODY MASS INDEX: 25.58 KG/M2 | WEIGHT: 131 LBS

## 2021-04-01 DIAGNOSIS — Z34.83 SUPERVISION OF NORMAL INTRAUTERINE PREGNANCY IN MULTIGRAVIDA IN THIRD TRIMESTER: Primary | ICD-10-CM

## 2021-04-01 DIAGNOSIS — Z3A.30 30 WEEKS GESTATION OF PREGNANCY: ICD-10-CM

## 2021-04-01 PROCEDURE — 99213 OFFICE O/P EST LOW 20 MIN: CPT | Performed by: OBSTETRICS & GYNECOLOGY

## 2021-04-01 NOTE — PROGRESS NOTES
Nirmala Torres is a 31 y.o. female 30w2d who presents for routine prenatal visit. The patient was seen and evaluated. There was normal fetal movements. No contractions, bleeding or leakage of fluid. Signs and symptoms of  labor as well as labor were reviewed. The S/S of Pre-Eclampsia were reviewed with the patient in detail. She is to report any of these if they occur. She currently denies any of these. Celeste Spivey is a 31 y.o. female with the following history as recorded in [facility]:  Patient Active Problem List    Diagnosis Date Noted    Hyperemesis 10/26/2020     Current Outpatient Medications   Medication Sig Dispense Refill    pantoprazole (PROTONIX) 40 MG tablet Take 1 tablet by mouth every morning (before breakfast) 30 tablet 2    Prenatal Vit-Fe Fumarate-FA (PRENATAL VITAMIN) 27-0.8 MG TABS Take 1 tablet by mouth daily 30 tablet 11    doxylamine-pyridoxine (DICLEGIS) 10-10 MG TBEC Take 1 tablet by mouth 2 times daily Start taking at night only, if nausea is not relieved, may take morning and night. (Patient not taking: Reported on 3/4/2021) 60 tablet 1    ondansetron (ZOFRAN ODT) 4 MG disintegrating tablet Take 1 tablet by mouth every 8 hours as needed for Nausea or Vomiting 40 tablet 5     No current facility-administered medications for this visit. Allergies: Codeine and Raspberry  History reviewed. No pertinent past medical history. Past Surgical History:   Procedure Laterality Date    APPENDECTOMY      EYE SURGERY      LEEP      TONSILLECTOMY       History reviewed. No pertinent family history.   Social History     Tobacco Use    Smoking status: Never Smoker    Smokeless tobacco: Never Used   Substance Use Topics    Alcohol use: No     Comment: occ         Mother's Prenatal Vitals  BP: 111/64  Weight: 131 lb (59.4 kg)  Pulse: 81  Patient Position: Sitting  Prenatal Fetal Information  Fundal Height (cm): 32 cm  Fetal Heart Rate: US-131  Movement: Present  Physical Exam      The patient had her 28 week labs completed. T-Dap Vaccine (27-36 weeks): completed    The patient was instructed on fetal kick counts and was given a kick sheet to complete every 8 hours. She was instructed that the baby should move at a minimum of ten times within one hour after a meal. The patient was instructed to lay down on her left side twenty minutes after eating and count movements for up to one hour with a target value of ten movements. She was instructed to notify the office if she did not make that target after two attempts or if after any attempt there was less than four movements. The patient reports that the targets have been made Yes. Assessment:   Diagnosis Orders   1. Supervision of normal intrauterine pregnancy in multigravida in third trimester     2. 30 weeks gestation of pregnancy               Plan:  1. PTL precautions   2.  Return in 1 weeks

## 2021-04-15 ENCOUNTER — ROUTINE PRENATAL (OUTPATIENT)
Dept: OBGYN CLINIC | Age: 26
End: 2021-04-15
Payer: MEDICAID

## 2021-04-15 VITALS
WEIGHT: 138 LBS | HEART RATE: 82 BPM | BODY MASS INDEX: 26.95 KG/M2 | DIASTOLIC BLOOD PRESSURE: 73 MMHG | SYSTOLIC BLOOD PRESSURE: 119 MMHG

## 2021-04-15 DIAGNOSIS — Z34.83 SUPERVISION OF NORMAL INTRAUTERINE PREGNANCY IN MULTIGRAVIDA IN THIRD TRIMESTER: Primary | ICD-10-CM

## 2021-04-15 DIAGNOSIS — O99.350 PREGNANCY RELATED CARPAL TUNNEL SYNDROME, ANTEPARTUM: ICD-10-CM

## 2021-04-15 DIAGNOSIS — N94.9 ROUND LIGAMENT PAIN: ICD-10-CM

## 2021-04-15 DIAGNOSIS — G56.00 PREGNANCY RELATED CARPAL TUNNEL SYNDROME, ANTEPARTUM: ICD-10-CM

## 2021-04-15 DIAGNOSIS — Z3A.32 32 WEEKS GESTATION OF PREGNANCY: ICD-10-CM

## 2021-04-15 PROCEDURE — 99213 OFFICE O/P EST LOW 20 MIN: CPT | Performed by: OBSTETRICS & GYNECOLOGY

## 2021-04-15 NOTE — PROGRESS NOTES
Pt presents today for routine prenatal visit. Pt denies vaginal bleeding or leaking of fluid +fetal movement. She states she has had some cramping. Pt states her hands go numb and they swell along with her feet. She states she itches all over a lot. Lamin Gunter is a 31 y.o. female 33w0d who presents for routine prenatal visit. The patient was seen and evaluated. There was normal fetal movements. No contractions, bleeding or leakage of fluid. Signs and symptoms of  labor as well as labor were reviewed. The S/S of Pre-Eclampsia were reviewed with the patient in detail. She is to report any of these if they occur. She currently denies any of these. Complains of bilateral inguinal pain that is worse when standing and with movement. Also numbness of hands. Worse in the morning after waking.    Enmanuel Su is a 31 y.o. female with the following history as recorded in [facility]:  Patient Active Problem List    Diagnosis Date Noted    Hyperemesis 10/26/2020     Current Outpatient Medications   Medication Sig Dispense Refill    pantoprazole (PROTONIX) 40 MG tablet Take 1 tablet by mouth every morning (before breakfast) 30 tablet 2    doxylamine-pyridoxine (DICLEGIS) 10-10 MG TBEC Take 1 tablet by mouth 2 times daily Start taking at night only, if nausea is not relieved, may take morning and night. 60 tablet 1    ondansetron (ZOFRAN ODT) 4 MG disintegrating tablet Take 1 tablet by mouth every 8 hours as needed for Nausea or Vomiting 40 tablet 5    Prenatal Vit-Fe Fumarate-FA (PRENATAL VITAMIN) 27-0.8 MG TABS Take 1 tablet by mouth daily 30 tablet 11     No current facility-administered medications for this visit. Allergies: Codeine and Raspberry  History reviewed. No pertinent past medical history. Past Surgical History:   Procedure Laterality Date    APPENDECTOMY      EYE SURGERY      LEEP      TONSILLECTOMY       History reviewed. No pertinent family history.   Social History     Tobacco Use    Smoking status: Never Smoker    Smokeless tobacco: Never Used   Substance Use Topics    Alcohol use: No     Comment: occ         Mother's Prenatal Vitals  BP: 119/73  Weight: 138 lb (62.6 kg)  Pulse: 82  Patient Position: Sitting  Prenatal Fetal Information  Fundal Height (cm): 33 cm  Fetal Heart Rate: 142  Movement: Present  Physical Exam      The patient had her 28 week labs completed. T-Dap Vaccine (27-36 weeks): completed    The patient was instructed on fetal kick counts and was given a kick sheet to complete every 8 hours. She was instructed that the baby should move at a minimum of ten times within one hour after a meal. The patient was instructed to lay down on her left side twenty minutes after eating and count movements for up to one hour with a target value of ten movements. She was instructed to notify the office if she did not make that target after two attempts or if after any attempt there was less than four movements. The patient reports that the targets have been made Yes. Assessment:   Diagnosis Orders   1. Supervision of normal intrauterine pregnancy in multigravida in third trimester     2. 32 weeks gestation of pregnancy     3. Pregnancy related carpal tunnel syndrome, antepartum     4. Round ligament pain               Plan:  1. PTL precautions   2. Pregnancy support belt ordered from [place]  3. Discussed sleeping strategies for carpal tunnel  4.  Return in 2 weeks

## 2021-04-15 NOTE — PATIENT INSTRUCTIONS
Patient Education        Weeks 32 to 29 of Your Pregnancy: Care Instructions  Overview     During the last few weeks of your pregnancy, you may have more aches and pains. It's important to rest when you can. Your growing baby is putting more pressure on your bladder. So you may need to urinate more often. Hemorrhoids are also common. These are painful, itchy veins in the rectal area. You may want to talk with your doctor about banking your baby's umbilical cord blood. This is the blood left in the cord after birth. If you want to save this blood, you must arrange it ahead of time. You can't decide at the last minute. If you haven't already had the Tdap shot during this pregnancy, talk to your doctor about getting it. It will help protect your  against pertussis infection. Follow-up care is a key part of your treatment and safety. Be sure to make and go to all appointments, and call your doctor if you are having problems. It's also a good idea to know your test results and keep a list of the medicines you take. How can you care for yourself at home? Ease hemorrhoids  · Get more liquids, fruits, vegetables, and fiber in your diet. This will help keep your stools soft. · Avoid sitting for too long. Lie on your left side several times a day. · Clean yourself with soft, moist toilet paper. Or you can use witch hazel pads or personal hygiene pads. · If you are uncomfortable, try ice packs. Or you can sit in a warm sitz bath. Do these for 20 minutes at a time, as needed. · Use hydrocortisone cream for pain and itching. Two examples are Anusol and Preparation H Hydrocortisone. · Ask your doctor about taking an over-the-counter stool softener. Consider breastfeeding  · Experts recommend that women breastfeed for 1 year or longer. · Breast milk may help protect your child from some health problems.   babies are less likely than formula-fed babies to:  ? Get ear infections, colds, diarrhea, and pneumonia. ? Be obese or get diabetes later in life. · Women who breastfeed have less bleeding after the birth. Their uteruses also shrink back faster. · Some women who breastfeed lose weight faster. Making milk burns calories. · Breastfeeding can lower your risk of breast cancer, ovarian cancer, and osteoporosis. Decide about circumcision for boys  · As you make this decision, it may help to think about your personal, Moravian, and family traditions. You get to decide if you will keep your son's penis natural or if he will be circumcised. · If you decide that you would like to have your baby circumcised, talk with your doctor. You can share your concerns about pain. And you can discuss your preferences for anesthesia. Where can you learn more? Go to [email or web address].[email or web address] org and sign in to your [employer] account. Enter X930 in the [employer] box to learn more about \"Weeks 32 to 34 of Your Pregnancy: Care Instructions. \"     If you do not have an account, please click on the \"Sign Up Now\" link. Current as of: October 8, 2020               Content Version: 12.8  © 0877-7326 Healthwise, Incorporated. Care instructions adapted under license by [facility] ([facility]). If you have questions about a medical condition or this instruction, always ask your healthcare professional. [address] any warranty or liability for your use of this information.

## 2021-04-29 ENCOUNTER — TELEMEDICINE (OUTPATIENT)
Dept: OBGYN CLINIC | Age: 26
End: 2021-04-29
Payer: MEDICAID

## 2021-04-29 DIAGNOSIS — Z3A.34 34 WEEKS GESTATION OF PREGNANCY: ICD-10-CM

## 2021-04-29 DIAGNOSIS — M54.30 SCIATICA, UNSPECIFIED LATERALITY: ICD-10-CM

## 2021-04-29 DIAGNOSIS — Z34.83 SUPERVISION OF NORMAL INTRAUTERINE PREGNANCY IN MULTIGRAVIDA IN THIRD TRIMESTER: Primary | ICD-10-CM

## 2021-04-29 PROCEDURE — 99213 OFFICE O/P EST LOW 20 MIN: CPT | Performed by: OBSTETRICS & GYNECOLOGY

## 2021-04-29 NOTE — PATIENT INSTRUCTIONS
Patient Education        Weeks 34 to 39 of Your Pregnancy: Care Instructions  Overview     By now, your baby and your belly have grown quite large. It's almost time to give birth! Your baby's lungs are almost ready to breathe air. The skull bones are firm enough to protect your baby's head, but soft enough to move down through the birth canal.  You may be feeling excited and happy at times--but also anxious or scared. You might wonder how you'll know if you're in labor or what to expect during labor. Try to be open and flexible in your expectations of the birth. Because each birth is different, there's no way to know exactly what childbirth will be like for you. Talk to your doctor or midwife about any concerns you have. If you haven't already had the Tdap shot during this pregnancy, talk to your doctor about getting it. It will help protect your  against pertussis infection. In the 36th week, most women have a test for group B streptococcus (GBS). GBS is a common bacteria that can live in the vagina and rectum. It can make your baby sick after birth. If you test positive, you will get antibiotics during labor. The medicine will help keep your baby from getting the bacteria. Follow-up care is a key part of your treatment and safety. Be sure to make and go to all appointments, and call your doctor if you are having problems. It's also a good idea to know your test results and keep a list of the medicines you take. How can you care for yourself at home? Learn about pain relief choices  · Pain is different for every woman. Talk with your doctor about your feelings about pain. · You can choose from several types of pain relief. These include medicine or breathing techniques, as well as comfort measures. You can use more than one option. · If you choose to have pain medicine during labor, talk to your doctor about your options. Some medicines lower anxiety and help with some of the pain.  Others make your lower body numb so that you won't feel pain. · Be sure to tell your doctor about your pain medicine choice before you start labor or very early in your labor. You may be able to change your mind as labor progresses. · Rarely, a woman is put to sleep by medicine given through a mask or an IV. Labor and delivery  · The first stage of labor has three parts: early, active, and transition. ? Most women have early labor at home. You can stay busy or rest, eat light snacks, drink clear fluids, and start counting contractions. ? When talking during a contraction gets hard, you may be moving to active labor. During active labor, you should head for the hospital if you are not there already. ? You are in active labor when contractions come every 3 to 4 minutes and last about 60 seconds. Your cervix is opening more rapidly. ? If your water breaks, contractions will come faster and stronger. ? During transition, your cervix is stretching, and contractions are coming more rapidly. ? You may want to push, but your cervix might not be ready. Your doctor will tell you when to push. · The second stage starts when your cervix is completely opened and you are ready to push. ? Contractions are very strong to push the baby down the birth canal.  ? You will feel the urge to push. You may feel like you need to have a bowel movement. ? You may be coached to push with contractions. These contractions will be very strong, but you will not have them as often. You can get a little rest between contractions. ? You may be emotional and irritable. You may not be aware of what is going on around you.  ? One last push, and your baby is born. · The third stage is when a few more contractions push out the placenta. This may take 30 minutes or less. · The fourth stage is the welcome recovery. You may feel overwhelmed with emotions and exhausted but alert. This is a good time to start breastfeeding. Where can you learn more?   Go to [email or web address] and sign in to your [employer] account. Enter P720 in the [facility] box to learn more about \"Weeks 34 to 36 of Your Pregnancy: Care Instructions. \"     If you do not have an account, please click on the \"Sign Up Now\" link. Current as of: October 8, 2020               Content Version: 12.8  © 2006-2021 Healthwise, Incorporated. Care instructions adapted under license by [facility] [place] ([facility]). If you have questions about a medical condition or this instruction, always ask your healthcare professional. [place] [address] any warranty or liability for your use of this information.

## 2021-04-29 NOTE — PROGRESS NOTES
2021    TELEHEALTH EVALUATION -- Audio/Visual (During HGOOM-38 public health emergency)   Maren Gibbs is a 31 y.o. female 35w2d who presents for routine prenatal visit. The patient was seen and evaluated. There was positive fetal movements. No contractions, bleeding or leakage of fluid. Signs and symptoms of  labor as well as labor were reviewed. The S/S of Pre-Eclampsia were reviewed with the patient in detail. She is to report any of these if they occur. She currently denies any of these. Pt having lower back pain, shooting down to leg, told this is sciatic pain. Pt having [place] contractions. Max Salinas is a 31 y.o. female with the following history as recorded in [facility]:  Patient Active Problem List    Diagnosis Date Noted    Hyperemesis 10/26/2020     Current Outpatient Medications   Medication Sig Dispense Refill    pantoprazole (PROTONIX) 40 MG tablet Take 1 tablet by mouth every morning (before breakfast) 30 tablet 2    doxylamine-pyridoxine (DICLEGIS) 10-10 MG TBEC Take 1 tablet by mouth 2 times daily Start taking at night only, if nausea is not relieved, may take morning and night. 60 tablet 1    ondansetron (ZOFRAN ODT) 4 MG disintegrating tablet Take 1 tablet by mouth every 8 hours as needed for Nausea or Vomiting 40 tablet 5    Prenatal Vit-Fe Fumarate-FA (PRENATAL VITAMIN) 27-0.8 MG TABS Take 1 tablet by mouth daily 30 tablet 11     No current facility-administered medications for this visit. Allergies: Codeine and Raspberry  History reviewed. No pertinent past medical history. Past Surgical History:   Procedure Laterality Date    APPENDECTOMY      EYE SURGERY      LEEP      TONSILLECTOMY       History reviewed. No pertinent family history.   Social History     Tobacco Use    Smoking status: Never Smoker    Smokeless tobacco: Never Used   Substance Use Topics    Alcohol use: No     Comment: occ            Physical

## 2021-05-06 ENCOUNTER — ROUTINE PRENATAL (OUTPATIENT)
Dept: OBGYN CLINIC | Age: 26
End: 2021-05-06
Payer: MEDICAID

## 2021-05-06 VITALS
SYSTOLIC BLOOD PRESSURE: 110 MMHG | DIASTOLIC BLOOD PRESSURE: 61 MMHG | BODY MASS INDEX: 27.54 KG/M2 | WEIGHT: 141 LBS | HEART RATE: 80 BPM

## 2021-05-06 DIAGNOSIS — Z36.85 ANTENATAL SCREENING FOR STREPTOCOCCUS B: ICD-10-CM

## 2021-05-06 DIAGNOSIS — Z34.83 SUPERVISION OF NORMAL INTRAUTERINE PREGNANCY IN MULTIGRAVIDA IN THIRD TRIMESTER: Primary | ICD-10-CM

## 2021-05-06 DIAGNOSIS — Z3A.35 35 WEEKS GESTATION OF PREGNANCY: ICD-10-CM

## 2021-05-06 PROCEDURE — 99213 OFFICE O/P EST LOW 20 MIN: CPT | Performed by: NURSE PRACTITIONER

## 2021-05-06 NOTE — PROGRESS NOTES
Philip Barros is here for a return obstetrical visit. Today she is 35w2d weeks EGA. She is doing well and has no complaints. She  does not have vaginal bleeding, leaking of fluid, contractions. She does not have blurred vision, SOB, or increased swelling in legs or face. Pt does feel fetal movement regularly. Objective: Mother's Prenatal Vitals  BP: 110/61  Weight: 141 lb (64 kg)  Pulse: 80  Patient Position: Sitting  Prenatal Fetal Information  Fundal Height (cm): 35 cm  Movement: Present  Pt is A&Ox3, in no acute distress. Normocephalic, atraumatic. PERRL. Resp even and non-labored. Skin pink, warm & dry. Gravid abdomen. GOMEZ's well. Gait steady. Assessment:  IUP at 35w2d wks      Diagnosis Orders   1. Supervision of normal intrauterine pregnancy in multigravida in third trimester     2. 35 weeks gestation of pregnancy     3.  screening for streptococcus B  Culture, Strep B Screen, Vaginal/Rectal     Plan:Pt counseled on GHTN precautions, Kick count and  labor  Continue with routine prenatal care. RTC in 1 wk for prenatal visit  GBS collected    MEDICATIONS:  No orders of the defined types were placed in this encounter.       ORDERS:  Orders Placed This Encounter   Procedures    Culture, Strep B Screen, Vaginal/Rectal

## 2021-05-06 NOTE — PATIENT INSTRUCTIONS
Patient Education        Weeks 34 to 39 of Your Pregnancy: Care Instructions  Overview     By now, your baby and your belly have grown quite large. It's almost time to give birth! Your baby's lungs are almost ready to breathe air. The skull bones are firm enough to protect your baby's head, but soft enough to move down through the birth canal.  You may be feeling excited and happy at times--but also anxious or scared. You might wonder how you'll know if you're in labor or what to expect during labor. Try to be open and flexible in your expectations of the birth. Because each birth is different, there's no way to know exactly what childbirth will be like for you. Talk to your doctor or midwife about any concerns you have. If you haven't already had the Tdap shot during this pregnancy, talk to your doctor about getting it. It will help protect your  against pertussis infection. In the 36th week, most women have a test for group B streptococcus (GBS). GBS is a common bacteria that can live in the vagina and rectum. It can make your baby sick after birth. If you test positive, you will get antibiotics during labor. The medicine will help keep your baby from getting the bacteria. Follow-up care is a key part of your treatment and safety. Be sure to make and go to all appointments, and call your doctor if you are having problems. It's also a good idea to know your test results and keep a list of the medicines you take. How can you care for yourself at home? Learn about pain relief choices  · Pain is different for every woman. Talk with your doctor about your feelings about pain. · You can choose from several types of pain relief. These include medicine or breathing techniques, as well as comfort measures. You can use more than one option. · If you choose to have pain medicine during labor, talk to your doctor about your options. Some medicines lower anxiety and help with some of the pain.  Others make your lower body numb so that you won't feel pain. · Be sure to tell your doctor about your pain medicine choice before you start labor or very early in your labor. You may be able to change your mind as labor progresses. · Rarely, a woman is put to sleep by medicine given through a mask or an IV. Labor and delivery  · The first stage of labor has three parts: early, active, and transition. ? Most women have early labor at home. You can stay busy or rest, eat light snacks, drink clear fluids, and start counting contractions. ? When talking during a contraction gets hard, you may be moving to active labor. During active labor, you should head for the hospital if you are not there already. ? You are in active labor when contractions come every 3 to 4 minutes and last about 60 seconds. Your cervix is opening more rapidly. ? If your water breaks, contractions will come faster and stronger. ? During transition, your cervix is stretching, and contractions are coming more rapidly. ? You may want to push, but your cervix might not be ready. Your doctor will tell you when to push. · The second stage starts when your cervix is completely opened and you are ready to push. ? Contractions are very strong to push the baby down the birth canal.  ? You will feel the urge to push. You may feel like you need to have a bowel movement. ? You may be coached to push with contractions. These contractions will be very strong, but you will not have them as often. You can get a little rest between contractions. ? You may be emotional and irritable. You may not be aware of what is going on around you.  ? One last push, and your baby is born. · The third stage is when a few more contractions push out the placenta. This may take 30 minutes or less. · The fourth stage is the welcome recovery. You may feel overwhelmed with emotions and exhausted but alert. This is a good time to start breastfeeding. Where can you learn more?   Go to [email or web address] and sign in to your [employer] account. Enter V085 in the [facility] box to learn more about \"Weeks 34 to 36 of Your Pregnancy: Care Instructions. \"     If you do not have an account, please click on the \"Sign Up Now\" link. Current as of: October 8, 2020               Content Version: 12.8  © 2006-2021 [employer]. Care instructions adapted under license by [facility] [place] ([facility]). If you have questions about a medical condition or this instruction, always ask your healthcare professional. [place] [address] any warranty or liability for your use of this information.

## 2021-05-09 LAB — GROUP B STREP CULTURE: NORMAL

## 2021-05-13 ENCOUNTER — ROUTINE PRENATAL (OUTPATIENT)
Dept: OBGYN CLINIC | Age: 26
End: 2021-05-13
Payer: MEDICAID

## 2021-05-13 VITALS
WEIGHT: 141 LBS | HEART RATE: 85 BPM | DIASTOLIC BLOOD PRESSURE: 66 MMHG | BODY MASS INDEX: 27.54 KG/M2 | SYSTOLIC BLOOD PRESSURE: 115 MMHG

## 2021-05-13 DIAGNOSIS — Z34.83 SUPERVISION OF NORMAL INTRAUTERINE PREGNANCY IN MULTIGRAVIDA IN THIRD TRIMESTER: Primary | ICD-10-CM

## 2021-05-13 DIAGNOSIS — Z3A.36 36 WEEKS GESTATION OF PREGNANCY: ICD-10-CM

## 2021-05-13 PROCEDURE — 99213 OFFICE O/P EST LOW 20 MIN: CPT | Performed by: OBSTETRICS & GYNECOLOGY

## 2021-05-13 NOTE — PROGRESS NOTES
Pt is here for prenatal appointment. She denies spotting, cramping. Pt states she does have contractions, they are not consistent. Brayan Sosa is a 31 y.o. female 36w2d who presents for routine prenatal visit. The patient was seen and evaluated. There was present fetal movements. No contractions, bleeding or leakage of fluid. Signs and symptoms of  labor as well as labor were reviewed. The S/S of Pre-Eclampsia were reviewed with the patient in detail. She is to report any of these if they occur. She currently denies any of these. Mikhail Fuentes is a 31 y.o. female with the following history as recorded in [facility]:  Patient Active Problem List    Diagnosis Date Noted     (normal spontaneous vaginal delivery)     39 weeks gestation of pregnancy 2021    Hyperemesis 10/26/2020     Current Outpatient Medications   Medication Sig Dispense Refill    Prenatal Vit-Fe Fumarate-FA (PRENATAL VITAMIN) 27-0.8 MG TABS Take 1 tablet by mouth daily 30 tablet 11    ibuprofen (ADVIL;MOTRIN) 800 MG tablet Take 1 tablet by mouth every 8 hours as needed for Pain 30 tablet 0     No current facility-administered medications for this visit. Allergies: Codeine and Raspberry  No past medical history on file. Past Surgical History:   Procedure Laterality Date    APPENDECTOMY      EYE SURGERY      LEEP      TONSILLECTOMY       No family history on file. Social History     Tobacco Use    Smoking status: Never Smoker    Smokeless tobacco: Never Used   Substance Use Topics    Alcohol use: No     Comment: occ         Mother's Prenatal Vitals  BP: 115/66  Weight: 141 lb (64 kg)  Pulse: 85  Patient Position: Sitting  Prenatal Fetal Information  Fundal Height (cm): 36 cm  Fetal Heart Rate: 142  Movement: Present  Presentation: Vertex  Dil/Eff/Sta  Dilation (cm): Fingertip  Effacement (%): 60  Physical Exam      The patient had her 28 week labs completed.     T-Dap Vaccine (27-36 weeks):

## 2021-05-20 ENCOUNTER — ROUTINE PRENATAL (OUTPATIENT)
Dept: OBGYN CLINIC | Age: 26
End: 2021-05-20
Payer: MEDICAID

## 2021-05-20 VITALS
SYSTOLIC BLOOD PRESSURE: 115 MMHG | WEIGHT: 142 LBS | HEART RATE: 82 BPM | DIASTOLIC BLOOD PRESSURE: 71 MMHG | BODY MASS INDEX: 27.73 KG/M2

## 2021-05-20 DIAGNOSIS — Z34.83 SUPERVISION OF NORMAL INTRAUTERINE PREGNANCY IN MULTIGRAVIDA IN THIRD TRIMESTER: Primary | ICD-10-CM

## 2021-05-20 DIAGNOSIS — Z3A.37 37 WEEKS GESTATION OF PREGNANCY: ICD-10-CM

## 2021-05-20 PROCEDURE — 99213 OFFICE O/P EST LOW 20 MIN: CPT | Performed by: OBSTETRICS & GYNECOLOGY

## 2021-05-20 NOTE — PROGRESS NOTES
Carolynne Severance is a 31 y.o. female 42w2d who presents for routine prenatal visit. The patient was seen and evaluated. There was positive fetal movements. No contractions, bleeding or leakage of fluid. Signs and symptoms of  labor as well as labor were reviewed. The S/S of Pre-Eclampsia were reviewed with the patient in detail. She is to report any of these if they occur. She currently denies any of these. Shayy Alicea is a 31 y.o. female with the following history as recorded in [facility]:  Patient Active Problem List    Diagnosis Date Noted    Hyperemesis 10/26/2020     Current Outpatient Medications   Medication Sig Dispense Refill    pantoprazole (PROTONIX) 40 MG tablet Take 1 tablet by mouth every morning (before breakfast) 30 tablet 2    doxylamine-pyridoxine (DICLEGIS) 10-10 MG TBEC Take 1 tablet by mouth 2 times daily Start taking at night only, if nausea is not relieved, may take morning and night. 60 tablet 1    ondansetron (ZOFRAN ODT) 4 MG disintegrating tablet Take 1 tablet by mouth every 8 hours as needed for Nausea or Vomiting 40 tablet 5    Prenatal Vit-Fe Fumarate-FA (PRENATAL VITAMIN) 27-0.8 MG TABS Take 1 tablet by mouth daily 30 tablet 11     No current facility-administered medications for this visit. Allergies: Codeine and Raspberry  History reviewed. No pertinent past medical history. Past Surgical History:   Procedure Laterality Date    APPENDECTOMY      EYE SURGERY      LEEP      TONSILLECTOMY       History reviewed. No pertinent family history.   Social History     Tobacco Use    Smoking status: Never Smoker    Smokeless tobacco: Never Used   Substance Use Topics    Alcohol use: No     Comment: occ         Mother's Prenatal Vitals  BP: 115/71  Weight: 142 lb (64.4 kg)  Pulse: 82  Patient Position: Sitting  Alb/Glu  Albumin: Trace  Glucose: Negative  Prenatal Fetal Information  Fundal Height (cm): 37 cm  Fetal Heart Rate: 144  Movement: Present  Presentation: Vertex  Dil/Eff/Sta  Dilation (cm): 1  Effacement (%): 50  Station: -3  Physical Exam  Constitutional:       General: She is not in acute distress. Appearance: Normal appearance. She is not ill-appearing, toxic-appearing or diaphoretic. HENT:      Head: Normocephalic and atraumatic. Eyes:      Extraocular Movements: Extraocular movements intact. Pulmonary:      Effort: Pulmonary effort is normal. No respiratory distress. Abdominal:      Palpations: Abdomen is soft. Tenderness: There is no abdominal tenderness. Musculoskeletal:         General: No swelling or tenderness. Normal range of motion. Right lower leg: No edema. Left lower leg: No edema. Skin:     General: Skin is warm and dry. Findings: No rash. Neurological:      Mental Status: She is alert and oriented to person, place, and time. Psychiatric:         Attention and Perception: Attention and perception normal.         Mood and Affect: Mood and affect normal.         Speech: Speech normal.         Behavior: Behavior normal. Behavior is cooperative. Thought Content: Thought content normal.         Cognition and Memory: Cognition and memory normal.         Judgment: Judgment normal.           The patient had her 28 week labs completed. T-Dap Vaccine (27-36 weeks): completed    The patient was instructed on fetal kick counts and was given a kick sheet to complete every 8 hours. She was instructed that the baby should move at a minimum of ten times within one hour after a meal. The patient was instructed to lay down on her left side twenty minutes after eating and count movements for up to one hour with a target value of ten movements. She was instructed to notify the office if she did not make that target after two attempts or if after any attempt there was less than four movements. The patient reports that the targets have been made Yes. Assessment:   Diagnosis Orders   1. Supervision of normal intrauterine pregnancy in multigravida in third trimester     2. 37 weeks gestation of pregnancy               Plan:  1. Labor precautions   2. Return in 1 weeks  3. Pt wishes to be induced at 43 weeks, will discuss at next visit. Tentatively 6-2-21  I Richy Plummer, am scribing for and in the presence of Dr. Joce Gonzalez. I, Dr. Joce Gonzalez, personally performed the services described in this documentation as scribed by Richy Plummer in my presence, and it is both accurate and complete.

## 2021-05-20 NOTE — PATIENT INSTRUCTIONS
Patient Education        Week 45 of Your Pregnancy: Care Instructions  Your Care Instructions     Believe it or not, your baby is almost here. You may have ideas about your baby's personality because of how much he or she moves. Or you may have noticed how he or she responds to sounds, warmth, cold, and light. You may even know what kind of music your baby likes. By now, you have a better idea of what to expect during delivery. You may have talked about your birth preferences with your doctor. But even if you want a vaginal birth, it is a good idea to learn about  births.  birth means that your baby is born through a cut (incision) in your lower belly. It is sometimes the best choice for the health of the baby and the mother. Follow-up care is a key part of your treatment and safety. Be sure to make and go to all appointments, and call your doctor if you are having problems. It's also a good idea to know your test results and keep a list of the medicines you take. How can you care for yourself at home? Learn about  birth  · Most C-sections are unplanned. They are done because of problems that occur during labor. These problems might include:  ? Labor that slows or stops. ? High blood pressure or other problems for the mother. ? Signs of distress in the baby. These signs may include a very fast or slow heart rate. · Although most mothers and babies do well after , it is major surgery. It has more risks than a vaginal delivery. · In some cases, a planned  may be safer than a vaginal delivery. This may be the case if:  ? The mother has a health problem, such as a heart condition. ? The baby isn't in a head-down position for delivery. This is called a breech position. ? The uterus has scars from past surgeries. This could increase the chance of a tear in the uterus. ? There is a problem with the placenta. ?  The mother has an infection, such as genital herpes, that could be spread to the baby. ? The mother is having twins or more. ? The baby weighs 9 to 10 pounds or more. · Because of the risks of , planned C-sections generally should be done only for medical reasons. And a planned  should be done at 39 weeks or later unless there is a medical reason to do it sooner. Know what to expect after delivery, and plan for the first few weeks at home  · You, your baby, and your partner or  will get identification bands. Only people with matching bands can  the baby from the nursery. · You will learn how to feed, diaper, and bathe your baby. And you will learn how to care for the umbilical cord stump. If your baby will be circumcised, you will also learn how to care for that. · Ask people to wait to visit you until you are at home. And ask them to wash their hands before they touch your baby. · Make sure you have another adult in your home for at least 2 or 3 days after the birth. · During the first 2 weeks, limit when friends and family can visit. · Do not allow visitors who have colds or infections. Make sure all visitors are up to date with their vaccinations. Never let anyone smoke around your baby. · Try to nap when the baby naps. Be aware of postpartum depression  · \"Baby blues\" are common for the first 1 to 2 weeks after birth. You may cry or feel sad or irritable for no reason. · For some women, these feelings last longer and are more intense. This is called postpartum depression. · If your symptoms last for more than a few weeks or you feel very depressed, ask your doctor for help. · Postpartum depression can be treated. Support groups and counseling can help. Sometimes medicine can also help. Where can you learn more? Go to [email or web address] org and sign in to your [employer] account. Enter B044 in the [employer] box to learn more about \"Week 38 of Your Pregnancy: Care Instructions. \"     If you do not

## 2021-05-27 ENCOUNTER — ROUTINE PRENATAL (OUTPATIENT)
Dept: OBGYN CLINIC | Age: 26
End: 2021-05-27
Payer: MEDICAID

## 2021-05-27 VITALS
DIASTOLIC BLOOD PRESSURE: 68 MMHG | HEART RATE: 83 BPM | WEIGHT: 142 LBS | SYSTOLIC BLOOD PRESSURE: 119 MMHG | BODY MASS INDEX: 27.73 KG/M2

## 2021-05-27 DIAGNOSIS — Z3A.38 38 WEEKS GESTATION OF PREGNANCY: ICD-10-CM

## 2021-05-27 DIAGNOSIS — Z34.83 SUPERVISION OF NORMAL INTRAUTERINE PREGNANCY IN MULTIGRAVIDA IN THIRD TRIMESTER: Primary | ICD-10-CM

## 2021-05-27 PROCEDURE — 99213 OFFICE O/P EST LOW 20 MIN: CPT | Performed by: OBSTETRICS & GYNECOLOGY

## 2021-05-27 NOTE — PROGRESS NOTES
Maren Gibbs is a 31 y.o. female 36w4d who presents for routine prenatal visit. The patient was seen and evaluated. There was positive fetal movements. No contractions, bleeding or leakage of fluid. Signs and symptoms of  labor as well as labor were reviewed. The S/S of Pre-Eclampsia were reviewed with the patient in detail. She is to report any of these if they occur. She currently denies any of these. Max Salinas is a 31 y.o. female with the following history as recorded in [facility]:  Patient Active Problem List    Diagnosis Date Noted     (normal spontaneous vaginal delivery)     39 weeks gestation of pregnancy 2021    Hyperemesis 10/26/2020     Current Outpatient Medications   Medication Sig Dispense Refill    Prenatal Vit-Fe Fumarate-FA (PRENATAL VITAMIN) 27-0.8 MG TABS Take 1 tablet by mouth daily 30 tablet 11    ibuprofen (ADVIL;MOTRIN) 800 MG tablet Take 1 tablet by mouth every 8 hours as needed for Pain 30 tablet 0     No current facility-administered medications for this visit. Allergies: Codeine and Raspberry  History reviewed. No pertinent past medical history. Past Surgical History:   Procedure Laterality Date    APPENDECTOMY      EYE SURGERY      LEEP      TONSILLECTOMY       History reviewed. No pertinent family history. Social History     Tobacco Use    Smoking status: Never Smoker    Smokeless tobacco: Never Used   Substance Use Topics    Alcohol use: No     Comment: occ         Mother's Prenatal Vitals  BP: 119/68  Weight: 142 lb (64.4 kg)  Pulse: 83  Patient Position: Sitting  Alb/Glu  Albumin: Negative  Glucose: Negative  Prenatal Fetal Information  Fundal Height (cm): 38 cm  Fetal Heart Rate: 166  Movement: Present  Presentation: Vertex  Dil/Eff/Sta  Dilation (cm): 1  Effacement (%): 70  Physical Exam  Constitutional:       General: She is not in acute distress. Appearance: Normal appearance.  She is not ill-appearing, Monitor Summary:    SR/ST   .18/.08/.36   toxic-appearing or diaphoretic. HENT:      Head: Normocephalic and atraumatic. Eyes:      Extraocular Movements: Extraocular movements intact. Pulmonary:      Effort: Pulmonary effort is normal. No respiratory distress. Abdominal:      Palpations: Abdomen is soft. Tenderness: There is no abdominal tenderness. Musculoskeletal:         General: No swelling or tenderness. Normal range of motion. Right lower leg: No edema. Left lower leg: No edema. Skin:     General: Skin is warm and dry. Findings: No rash. Neurological:      Mental Status: She is alert and oriented to person, place, and time. Psychiatric:         Attention and Perception: Attention and perception normal.         Mood and Affect: Mood and affect normal.         Speech: Speech normal.         Behavior: Behavior normal. Behavior is cooperative. Thought Content: Thought content normal.         Cognition and Memory: Cognition and memory normal.         Judgment: Judgment normal.           The patient had her 28 week labs completed. T-Dap Vaccine (27-36 weeks): completed    The patient was instructed on fetal kick counts and was given a kick sheet to complete every 8 hours. She was instructed that the baby should move at a minimum of ten times within one hour after a meal. The patient was instructed to lay down on her left side twenty minutes after eating and count movements for up to one hour with a target value of ten movements. She was instructed to notify the office if she did not make that target after two attempts or if after any attempt there was less than four movements. The patient reports that the targets have been made Yes. Assessment:   Diagnosis Orders   1. Supervision of normal intrauterine pregnancy in multigravida in third trimester     2. 38 weeks gestation of pregnancy               Plan:  1. Labor precautions   2.  Induction scheduled for 6-2-21 at [address] [facility] [address], am scribing for and in the presence of Dr. Denys Pacheco. I, Dr. Denys Pacheco, personally performed the services described in this documentation as scribed by Jaswinder Varner in my presence, and it is both accurate and complete.

## 2021-06-03 ENCOUNTER — TELEPHONE (OUTPATIENT)
Dept: OBGYN CLINIC | Age: 26
End: 2021-06-03

## 2021-06-03 ENCOUNTER — HOSPITAL ENCOUNTER (INPATIENT)
Age: 26
LOS: 2 days | Discharge: HOME | End: 2021-06-05
Attending: OBSTETRICS & GYNECOLOGY | Admitting: OBSTETRICS & GYNECOLOGY
Payer: MEDICAID

## 2021-06-03 PROBLEM — Z3A.39 39 WEEKS GESTATION OF PREGNANCY: Status: ACTIVE | Noted: 2021-06-03

## 2021-06-03 LAB
ABO/RH: NORMAL
AMPHETAMINE SCREEN, URINE: NEGATIVE
ANTIBODY SCREEN: NORMAL
BARBITURATE SCREEN URINE: NEGATIVE
BENZODIAZEPINE SCREEN, URINE: NEGATIVE
CANNABINOID SCREEN URINE: NEGATIVE
COCAINE METABOLITE SCREEN URINE: NEGATIVE
HCT VFR BLD CALC: 36.9 % (ref 37–47)
HEMOGLOBIN: 12.5 G/DL (ref 12–16)
Lab: NORMAL
MCH RBC QN AUTO: 31.2 PG (ref 27–31)
MCHC RBC AUTO-ENTMCNC: 33.9 G/DL (ref 33–37)
MCV RBC AUTO: 92 FL (ref 81–99)
OPIATE SCREEN URINE: NEGATIVE
PDW BLD-RTO: 12.7 % (ref 11.5–14.5)
PLATELET # BLD: 319 K/UL (ref 130–400)
PMV BLD AUTO: 10.5 FL (ref 9.4–12.3)
RBC # BLD: 4.01 M/UL (ref 4.2–5.4)
WBC # BLD: 12.2 K/UL (ref 4.8–10.8)

## 2021-06-03 PROCEDURE — 86900 BLOOD TYPING SEROLOGIC ABO: CPT

## 2021-06-03 PROCEDURE — 80307 DRUG TEST PRSMV CHEM ANLYZR: CPT

## 2021-06-03 PROCEDURE — 36415 COLL VENOUS BLD VENIPUNCTURE: CPT

## 2021-06-03 PROCEDURE — 85027 COMPLETE CBC AUTOMATED: CPT

## 2021-06-03 PROCEDURE — 86901 BLOOD TYPING SEROLOGIC RH(D): CPT

## 2021-06-03 PROCEDURE — 86592 SYPHILIS TEST NON-TREP QUAL: CPT

## 2021-06-03 PROCEDURE — 1220000000 HC SEMI PRIVATE OB R&B

## 2021-06-03 PROCEDURE — 86850 RBC ANTIBODY SCREEN: CPT

## 2021-06-03 PROCEDURE — 2580000003 HC RX 258: Performed by: OBSTETRICS & GYNECOLOGY

## 2021-06-03 PROCEDURE — 6360000002 HC RX W HCPCS: Performed by: OBSTETRICS & GYNECOLOGY

## 2021-06-03 RX ORDER — SODIUM CHLORIDE, SODIUM LACTATE, POTASSIUM CHLORIDE, AND CALCIUM CHLORIDE .6; .31; .03; .02 G/100ML; G/100ML; G/100ML; G/100ML
1000 INJECTION, SOLUTION INTRAVENOUS PRN
Status: DISCONTINUED | OUTPATIENT
Start: 2021-06-03 | End: 2021-06-05 | Stop reason: HOSPADM

## 2021-06-03 RX ORDER — SODIUM CHLORIDE, SODIUM LACTATE, POTASSIUM CHLORIDE, CALCIUM CHLORIDE 600; 310; 30; 20 MG/100ML; MG/100ML; MG/100ML; MG/100ML
INJECTION, SOLUTION INTRAVENOUS CONTINUOUS
Status: DISCONTINUED | OUTPATIENT
Start: 2021-06-03 | End: 2021-06-05 | Stop reason: HOSPADM

## 2021-06-03 RX ORDER — SODIUM CHLORIDE 0.9 % (FLUSH) 0.9 %
5-40 SYRINGE (ML) INJECTION PRN
Status: DISCONTINUED | OUTPATIENT
Start: 2021-06-03 | End: 2021-06-05 | Stop reason: HOSPADM

## 2021-06-03 RX ORDER — SODIUM CHLORIDE, SODIUM LACTATE, POTASSIUM CHLORIDE, AND CALCIUM CHLORIDE .6; .31; .03; .02 G/100ML; G/100ML; G/100ML; G/100ML
500 INJECTION, SOLUTION INTRAVENOUS PRN
Status: DISCONTINUED | OUTPATIENT
Start: 2021-06-03 | End: 2021-06-05 | Stop reason: HOSPADM

## 2021-06-03 RX ORDER — SODIUM CHLORIDE 9 MG/ML
25 INJECTION, SOLUTION INTRAVENOUS PRN
Status: DISCONTINUED | OUTPATIENT
Start: 2021-06-03 | End: 2021-06-05 | Stop reason: HOSPADM

## 2021-06-03 RX ORDER — SODIUM CHLORIDE 0.9 % (FLUSH) 0.9 %
5-40 SYRINGE (ML) INJECTION EVERY 12 HOURS SCHEDULED
Status: DISCONTINUED | OUTPATIENT
Start: 2021-06-03 | End: 2021-06-05 | Stop reason: HOSPADM

## 2021-06-03 RX ADMIN — Medication 1 MILLI-UNITS/MIN: at 18:48

## 2021-06-03 RX ADMIN — SODIUM CHLORIDE, SODIUM LACTATE, POTASSIUM CHLORIDE, AND CALCIUM CHLORIDE: 600; 310; 30; 20 INJECTION, SOLUTION INTRAVENOUS at 21:28

## 2021-06-03 RX ADMIN — SODIUM CHLORIDE, SODIUM LACTATE, POTASSIUM CHLORIDE, AND CALCIUM CHLORIDE: 600; 310; 30; 20 INJECTION, SOLUTION INTRAVENOUS at 19:42

## 2021-06-03 ASSESSMENT — PAIN SCALES - GENERAL: PAINLEVEL_OUTOF10: 0

## 2021-06-03 NOTE — TELEPHONE ENCOUNTER
Dr. Joanna Kwok called pt re induction. Labor and Delivery has no beds at this time. She offered the patient a bed in the back of the unit to come in today, but told pt there should be beds this evening. Pt agrees to wait until a bed opens up this evening. Zainab Lemus will call the patient this evening to update her.

## 2021-06-04 PROCEDURE — 6360000002 HC RX W HCPCS: Performed by: OBSTETRICS & GYNECOLOGY

## 2021-06-04 PROCEDURE — 1220000000 HC SEMI PRIVATE OB R&B

## 2021-06-04 PROCEDURE — 6370000000 HC RX 637 (ALT 250 FOR IP): Performed by: OBSTETRICS & GYNECOLOGY

## 2021-06-04 PROCEDURE — 59409 OBSTETRICAL CARE: CPT | Performed by: OBSTETRICS & GYNECOLOGY

## 2021-06-04 PROCEDURE — 2580000003 HC RX 258: Performed by: OBSTETRICS & GYNECOLOGY

## 2021-06-04 PROCEDURE — 7200000001 HC VAGINAL DELIVERY

## 2021-06-04 PROCEDURE — 3E033VJ INTRODUCTION OF OTHER HORMONE INTO PERIPHERAL VEIN, PERCUTANEOUS APPROACH: ICD-10-PCS | Performed by: OBSTETRICS & GYNECOLOGY

## 2021-06-04 RX ORDER — SODIUM CHLORIDE 0.9 % (FLUSH) 0.9 %
5-40 SYRINGE (ML) INJECTION PRN
Status: DISCONTINUED | OUTPATIENT
Start: 2021-06-04 | End: 2021-06-05 | Stop reason: HOSPADM

## 2021-06-04 RX ORDER — SODIUM CHLORIDE, SODIUM LACTATE, POTASSIUM CHLORIDE, CALCIUM CHLORIDE 600; 310; 30; 20 MG/100ML; MG/100ML; MG/100ML; MG/100ML
INJECTION, SOLUTION INTRAVENOUS CONTINUOUS
Status: DISCONTINUED | OUTPATIENT
Start: 2021-06-04 | End: 2021-06-05 | Stop reason: HOSPADM

## 2021-06-04 RX ORDER — DOCUSATE SODIUM 100 MG/1
100 CAPSULE, LIQUID FILLED ORAL 2 TIMES DAILY
Status: DISCONTINUED | OUTPATIENT
Start: 2021-06-04 | End: 2021-06-04 | Stop reason: SDUPTHER

## 2021-06-04 RX ORDER — DOCUSATE SODIUM 100 MG/1
100 CAPSULE, LIQUID FILLED ORAL 2 TIMES DAILY
Status: DISCONTINUED | OUTPATIENT
Start: 2021-06-04 | End: 2021-06-05 | Stop reason: HOSPADM

## 2021-06-04 RX ORDER — SODIUM CHLORIDE 9 MG/ML
25 INJECTION, SOLUTION INTRAVENOUS PRN
Status: DISCONTINUED | OUTPATIENT
Start: 2021-06-04 | End: 2021-06-05 | Stop reason: HOSPADM

## 2021-06-04 RX ORDER — SODIUM CHLORIDE 0.9 % (FLUSH) 0.9 %
5-40 SYRINGE (ML) INJECTION EVERY 12 HOURS SCHEDULED
Status: DISCONTINUED | OUTPATIENT
Start: 2021-06-04 | End: 2021-06-05 | Stop reason: HOSPADM

## 2021-06-04 RX ORDER — ACETAMINOPHEN 325 MG/1
650 TABLET ORAL EVERY 4 HOURS PRN
Status: DISCONTINUED | OUTPATIENT
Start: 2021-06-04 | End: 2021-06-05 | Stop reason: HOSPADM

## 2021-06-04 RX ORDER — ONDANSETRON 2 MG/ML
4 INJECTION INTRAMUSCULAR; INTRAVENOUS EVERY 6 HOURS PRN
Status: DISCONTINUED | OUTPATIENT
Start: 2021-06-04 | End: 2021-06-05 | Stop reason: HOSPADM

## 2021-06-04 RX ORDER — IBUPROFEN 400 MG/1
800 TABLET ORAL EVERY 8 HOURS PRN
Status: DISCONTINUED | OUTPATIENT
Start: 2021-06-04 | End: 2021-06-05 | Stop reason: HOSPADM

## 2021-06-04 RX ADMIN — Medication 166.7 ML: at 06:04

## 2021-06-04 RX ADMIN — SODIUM CHLORIDE, SODIUM LACTATE, POTASSIUM CHLORIDE, AND CALCIUM CHLORIDE: 600; 310; 30; 20 INJECTION, SOLUTION INTRAVENOUS at 01:41

## 2021-06-04 RX ADMIN — BUTORPHANOL TARTRATE 1 MG: 2 INJECTION, SOLUTION INTRAMUSCULAR; INTRAVENOUS at 05:22

## 2021-06-04 RX ADMIN — IBUPROFEN 800 MG: 400 TABLET, FILM COATED ORAL at 17:20

## 2021-06-04 ASSESSMENT — PAIN SCALES - GENERAL
PAINLEVEL_OUTOF10: 9
PAINLEVEL_OUTOF10: 5

## 2021-06-04 NOTE — H&P
Oj Junior is a 31 y.o. female patient who presents at 45 4/7 wks for IOL. No diagnosis found. History reviewed. No pertinent past medical history. OB History        3    Para   2    Term   2            AB        Living   2       SAB        TAB        Ectopic        Molar        Multiple        Live Births   2              39w3d  Estimated Date of Delivery: 21  Allergies   Allergen Reactions    Codeine Swelling    Raspberry      Active Problems:    39 weeks gestation of pregnancy  Resolved Problems:    * No resolved hospital problems. *    Blood pressure (!) 113/59, pulse 86, temperature 98.6 °F (37 °C), resp. rate 16, height 5' (1.524 m), weight 142 lb (64.4 kg), last menstrual period 2020, SpO2 99 %, not currently breastfeeding. Maternal Medical History:   Fetal activity: Perceived fetal activity is normal.      Prenatal complications: no prenatal complications  Prenatal Complications - Diabetes: none. Maternal Exam:   Abdomen: Patient reports no abdominal tenderness. Fetal presentation: vertex    Introitus: Normal vulva. Normal vagina. Pelvis: adequate for delivery. Fetal Exam  Fetal Monitor Review: Mode: ultrasound. Variability: moderate (6-25 bpm). Pattern: accelerations present and no decelerations. Fetal State Assessment: Category I - tracings are normal.          Assessment:  1. IUP at 39 3/7 wks  2. GBS negative    Plan:  1. Admit for pitocin IOL  2. Routine admission labs  3. Analgesia per patient request  4.   Anticipate       Vita Montague MD  2021

## 2021-06-04 NOTE — L&D DELIVERY NOTE
Mother's Information    Labor Events     labor?: No  Rupture type: Spontaneous=SROM, Intact  Fluid color: Clear  Fluid odor: None     Mother Delivery Information    Episiotomy: None  Lacerations: None  Repair Suture: None  Vaginal Delivery Est. Blood Loss (mL): 300  Surgical or Additional Est. Blood Loss (mL): 0 (View Only): Edit in Flowsheets   Combined Est. Blood Loss (mL): 300        Tyron Mosquera [facility] [[id number]]    Labor Events     labor?: No   steroids?: None  Cervical ripening date/time:     Rupture date/time: 21 05:45:00   Rupture type: Spontaneous=SROM, Intact  Fluid color: Clear  Fluid odor: None          Labor Event Times    Labor onset date/time:     Dilation complete date/time:  21 CDT   Start pushin2021 0551   Decision time (emergent ):        Anesthesia    Method: Other  Analgesics: BUTORPHANOL TARTRATE 1 MG/ML IJ SOLN     Assisted Delivery Details    Forceps attempted?: No  Vacuum extractor attempted?: No     Document Additional Attempt       Document Additional Attempt             Shoulder Dystocia    Shoulder dystocia present?: No  Add Second Maneuver  Add Third Maneuver  Add Fourth Maneuver  Add Fifth Maneuver  Add Sixth Maneuver  Add Seventh Maneuver  Add Eighth Maneuver  Add Ninth Maneuver      Presentation    Presentation: Vertex  Position: Right  _: Occiput  _: Anterior     [place] Information    Head delivery date/time: 2021 05:51:00   Changing the 's delivery date/time could affect patient care.:    Delivery date/time:  21 0551   Delivery type: Vaginal, Spontaneous    Details:        Delivery Providers    Delivering clinician: Meaghan Williamson MD   Provider Role    Sarath Green RN Registered Nurse    Darcy Gurrola RN Registered Nurse    Akilah Douglas RN Registered Nurse    Andrea Rodriguez Surgical Tech      Cord    Vessels: 3 Vessels  Complications: Nuchal  Nuchal intervention: reduced  Nuchal cord description: loose nuchal cord  Cord around: head  Number of loops: 1  Delayed cord clamping?: Yes  Cord clamped date/time: 2021 0554  Cord blood disposition: Lab  Gases sent?: No  Stem cell collection (by provider): No     Placenta    Date/time: 2021 06:04:00  Removal: Spontaneous  Appearance: Intact  Disposition: Discarded     Delivery Resuscitation    Method: Bulb Suction, Stimulation     Apgars    Living status: Living  Apgars   1 Minute:  5 Minute:  10 Minute 15 Minute 20 Minute   Skin Color: 1  2       Heart Rate: 2  2       Reflex Irritability: 2  2       Muscle Tone: 2  2       Respiratory Effort: 2  2       Total: 9  10               Apgars Assigned By: VENKATESH JOHNSON     Skin to Skin    Skin to skin initiation date/time: 21 06:55:00 CDT   Skin to skin with:  Mother  Skin to skin end date/time:        [place] Measurements       Delivery Information    Episiotomy: None  Perineal lacerations: None    Vaginal laceration: No    Cervical laceration: No    Vaginal delivery est. blood loss (mL): 300  Surgical or additional est. blood loss (mL): 0 (View Only): Edit in Flowsheets   Combined est. blood loss (mL): 300  Repair suture: None     Vaginal Delivery Counts    Initial count personnel: ANNA GRIFFIN  Initial count verified by: Ashtyn Cosme RN   4x4:  Needles:  Instruments:  Lap Pads:  Sponges:    Initial counts:         Final counts:         Final count personnel: ANTONIA GRIFFIN  Final count verified byTaylor Webb RN  Accurate final count?: Yes  Final vaginal sweep completed: Yes     Other Procedures    Procedures: None

## 2021-06-05 VITALS
HEIGHT: 60 IN | OXYGEN SATURATION: 97 % | RESPIRATION RATE: 16 BRPM | SYSTOLIC BLOOD PRESSURE: 115 MMHG | TEMPERATURE: 97.9 F | DIASTOLIC BLOOD PRESSURE: 69 MMHG | WEIGHT: 142 LBS | HEART RATE: 70 BPM | BODY MASS INDEX: 27.88 KG/M2

## 2021-06-05 LAB
HCT VFR BLD CALC: 31.5 % (ref 37–47)
HEMOGLOBIN: 10.7 G/DL (ref 12–16)
MCH RBC QN AUTO: 31.3 PG (ref 27–31)
MCHC RBC AUTO-ENTMCNC: 34 G/DL (ref 33–37)
MCV RBC AUTO: 92.1 FL (ref 81–99)
PDW BLD-RTO: 12.8 % (ref 11.5–14.5)
PLATELET # BLD: 257 K/UL (ref 130–400)
PMV BLD AUTO: 10.3 FL (ref 9.4–12.3)
RBC # BLD: 3.42 M/UL (ref 4.2–5.4)
WBC # BLD: 15.7 K/UL (ref 4.8–10.8)

## 2021-06-05 PROCEDURE — 99239 HOSP IP/OBS DSCHRG MGMT >30: CPT | Performed by: ADVANCED PRACTICE MIDWIFE

## 2021-06-05 PROCEDURE — 36415 COLL VENOUS BLD VENIPUNCTURE: CPT

## 2021-06-05 PROCEDURE — 6370000000 HC RX 637 (ALT 250 FOR IP): Performed by: OBSTETRICS & GYNECOLOGY

## 2021-06-05 PROCEDURE — 85027 COMPLETE CBC AUTOMATED: CPT

## 2021-06-05 RX ORDER — IBUPROFEN 800 MG/1
800 TABLET ORAL EVERY 8 HOURS PRN
Qty: 30 TABLET | Refills: 0 | Status: SHIPPED | OUTPATIENT
Start: 2021-06-05 | End: 2021-12-23

## 2021-06-05 RX ADMIN — DOCUSATE SODIUM 100 MG: 100 CAPSULE, LIQUID FILLED ORAL at 07:47

## 2021-06-05 RX ADMIN — IBUPROFEN 800 MG: 400 TABLET, FILM COATED ORAL at 07:47

## 2021-06-05 ASSESSMENT — PAIN SCALES - GENERAL: PAINLEVEL_OUTOF10: 3

## 2021-06-05 NOTE — FLOWSHEET NOTE
Assumed care of pt. Pt sitting up in bed holding infant. Significant other in room with pt. Assessment completed, stable. Tolerating regular diet, voiding and ambulating without difficulty. Denies pain or needs at this time.

## 2021-06-05 NOTE — DISCHARGE SUMMARY
[facility]   Discharge Summary    Patient Name: Jose Alfredo Cox  Patient : 1995  Room/Bed: 0210/0210-01  Primary Care Physician: No primary care provider on file. Admit Date: 6/3/2021  5:35 PM    Reasons for Admission on 6/3/2021  5:35 PM  39 weeks gestation of pregnancy [Z3A.39]  No comment available  Induction of Labor    Patient Active Problem List   Diagnosis    Hyperemesis    39 weeks gestation of pregnancy     (normal spontaneous vaginal delivery)       Jose Alfredo Cox is a 31y.o. year old  who presented at 39w3d gestation with Scheduled Induction  . Her pregnancy has been uncomplicated   Please see H&P for detailed information. She was admitted and progressed in labor with pitocin for induction and no anesthesia to completely dilated and effaced. No Immediate complications were encountered. Please see procedure note for detailed information. Her postpartum course has been unremarkable. See H&H history below. She had no signs or symptoms of acute blood loss anemia. She is ambulating well, voiding without difficulty and her lochia is within normal limits. She is feeding by bottle without difficulty. She was stable for discharge on postpartum day 2. Hemoglobin   Date Value Ref Range Status   2021 10.7 (L) 12.0 - 16.0 g/dL Final   2021 12.5 12.0 - 16.0 g/dL Final     Hematocrit   Date Value Ref Range Status   2021 31.5 (L) 37.0 - 47.0 % Final   2021 36.9 (L) 37.0 - 47.0 % Final         Prenatal Procedures  None    Intrapartum Procedures  Spontaneous Vaginal Delivery: See Labor and Delivery Summary    Postpartum Procedures  None     Data  Information for the patient's :  Amari Souza [[id number]]   male   Birth Weight: 6 lb 8.1 oz (2.95 kg)         Postpartum Day 2: Vaginal      REVIEW OF SYSTEMS  Patient is a [id number] The patient feels well. The patient denies emotional concerns.  Pain is well controlled with current medications. The baby iswell. Baby is feeding via bottle - Similac with iron. Urinary output is adequate. The patient is ambulating well. The patient is tolerating a normal diet. PHYSICAL EXAM     /66   Pulse 72   Temp 97.3 °F (36.3 °C) (Temporal)   Resp 16   Ht 5' (1.524 m)   Wt 142 lb (64.4 kg)   LMP 2020   SpO2 96%   Breastfeeding Unknown   BMI 27.73 kg/m²   General:    alert, appears stated age and cooperative   Breast:  Soft, non-tender   Bowel Sounds:  active   Lochia:  appropriate   Uterine Fundus:   firm   Episiotomy/lac:  healing well, no significant drainage, no dehiscence, no significant erythema   DVT Evaluation:  No evidence of DVT seen on physical exam.     Lab Results   Component Value Date    WBC 15.7 (H) 2021    HGB 10.7 (L) 2021    HCT 31.5 (L) 2021    MCV 92.1 2021     2021         DISCHARGE DIAGNOSIS:      Discharge Information/Patient Instructions:   Concha Andres   Home Medication Instructions [id number]    Printed on:21 8386   Medication Information                      ibuprofen (ADVIL;MOTRIN) 800 MG tablet  Take 1 tablet by mouth every 8 hours as needed for Pain             Prenatal Vit-Fe Fumarate-FA (PRENATAL VITAMIN) 27-0.8 MG TABS  Take 1 tablet by mouth daily                 No discharge procedures on file. Activity & Diet: Precautions and instructions were discussed with her including but not limited to maintaining a regular diet at home, practicing local hygiene, pelvic rest, and signs and symptoms to report including heavy bleeding, frequent passage of clots, fainting or dizziness, foul odor of lochia, increased pain, fever, or any other concerns. Wound Care: N/A    Discharge to: Home in stable condition  She was asked to follow up in the office in 2 weeks. Discharge Date: 2021      MYESHA Vickers CNM      Comments: Follow-up care and birth control were reviewed. Signs and symptoms of mastitis and Post Partum Depression were reviewed. The patient is to notify her physician if any of these occur. The patient was counseled on secondary smoke risks and the increased risk of sudden infant death syndrome and respiratory problems to her baby with exposure. She was counseled on various alternate recommendations to decrease the exposure to secondary smoke to her children. Total time spent on discharge and coordination of care is >30 minutes. All questions were answered and the patient voiced understanding.

## 2021-06-07 LAB — RPR: NORMAL

## 2021-06-29 ENCOUNTER — TELEMEDICINE (OUTPATIENT)
Dept: OBGYN CLINIC | Age: 26
End: 2021-06-29

## 2021-06-29 PROCEDURE — 99024 POSTOP FOLLOW-UP VISIT: CPT | Performed by: NURSE PRACTITIONER

## 2021-06-29 ASSESSMENT — ENCOUNTER SYMPTOMS
EYES NEGATIVE: 1
DIARRHEA: 0
GASTROINTESTINAL NEGATIVE: 1
RESPIRATORY NEGATIVE: 1
CONSTIPATION: 0
ALLERGIC/IMMUNOLOGIC NEGATIVE: 1

## 2021-06-29 NOTE — PATIENT INSTRUCTIONS
Tylenol. Too much acetaminophen (Tylenol) can be harmful. Stitches  · If you have stitches, they will dissolve on their own and don't need to be removed. Follow your doctor's instructions for cleaning the stitched area. · Put ice or a cold pack on your painful area for 10 to 20 minutes at a time, several times a day, for the first few days. Put a thin cloth between the ice and your skin. · Sit in a few inches of warm water (sitz bath) 3 times a day and after bowel movements. The warm water helps with pain and itching. If you don't have a tub, a warm shower might help. Breast fullness  · Your breasts may overfill (engorge) in the first few days after delivery. To help milk flow and to relieve pain, warm your breasts in the shower or by using warm, moist towels before nursing. · If you aren't nursing, don't put warmth on your breasts or touch your breasts. Wear a bra that fits well and use ice until the fullness goes away. This usually takes 2 to 3 days. · Put ice or a cold pack on your breast after nursing to reduce swelling and pain. Put a thin cloth between the ice and your skin. Activity  · Eat a balanced diet. Don't try to lose weight by cutting calories. Keep taking your prenatal vitamins, or take a multivitamin. · Get as much rest as you can. Try to take naps when your baby sleeps during the day. · Get some exercise every day. But don't do any heavy exercise until your doctor says it is okay. · Wait until you are healed (about 4 to 6 weeks) before you have sexual intercourse. Your doctor will tell you when it is okay to have sex. · If you don't want to get pregnant, talk to your doctor about birth control. You can get pregnant even before your period returns. Also, you can get pregnant while you are breastfeeding. Mental health  · It's normal to have some sadness, anxiety, sleeplessness, and mood swings after you go home.  If you feel upset or hopeless for more than a few days or are having trouble doing the things you need to do, talk to your doctor. Constipation and hemorrhoids  · Drink plenty of fluids. If you have kidney, heart, or liver disease and have to limit fluids, talk with your doctor before you increase the amount of fluids you drink. · Eat plenty of fiber each day. Have a bran muffin or bran cereal for breakfast. Try eating a piece of fruit for a mid-afternoon snack. · For painful, itchy hemorrhoids, put ice or a cold pack on the area several times a day for 10 minutes at a time. Follow this by putting a warm compress on the area for another 10 to 20 minutes or by sitting in a shallow, warm bath. When should you call for help? Call 911  anytime you think you may need emergency care. For example, call if:    · You have thoughts of harming yourself, your baby, or another person.     · You passed out (lost consciousness).     · You have chest pain, are short of breath, or cough up blood.     · You have a seizure. Call your doctor now or seek immediate medical care if:    · You have severe vaginal bleeding.     · You are dizzy or lightheaded, or you feel like you may faint.     · You have a fever.     · You have new or more pain in your belly or pelvis.     · You have symptoms of a blood clot in your leg (called a deep vein thrombosis), such as:  ? Pain in the calf, back of the knee, thigh, or groin. ? Redness and swelling in your leg or groin.     · You have signs of preeclampsia, such as:  ? Sudden swelling of your face, hands, or feet. ? New vision problems (such as dimness, blurring, or seeing spots). ? A severe headache. Watch closely for changes in your health, and be sure to contact your doctor if:    · Your vaginal bleeding seems to be getting heavier.     · You have new or worse vaginal discharge.     · You feel sad, anxious, or hopeless for more than a few days.     · You do not get better as expected. Where can you learn more? Go to [email or web address]

## 2021-06-29 NOTE — PROGRESS NOTES
2021    TELEHEALTH EVALUATION -- Audio/Visual (During XGMOX-18 public health emergency)    HPI:    Safia Guevara (:  1995) has requested an audio/video evaluation for the following concern(s):    Pt presents for 2 week PP visit. Denies any issues with bleeding or cramping. Bleeding has stopped for now. Currently bottlefeeding, no breast issues. Denies any PPD. Interested in [place] for birth control. Has had in the past and done well. Review of Systems   Constitutional: Negative. HENT: Negative. Eyes: Negative. Respiratory: Negative. Cardiovascular: Negative. Gastrointestinal: Negative. Negative for constipation and diarrhea. Endocrine: Negative. Genitourinary: Positive for vaginal bleeding (normal lochia). Negative for frequency, menstrual problem and urgency. Musculoskeletal: Negative. Skin: Negative. Allergic/Immunologic: Negative. Neurological: Negative. Hematological: Negative. Psychiatric/Behavioral: Negative. All other systems reviewed and are negative. Prior to Visit Medications    Medication Sig Taking?  Authorizing Provider   ibuprofen (ADVIL;MOTRIN) 800 MG tablet Take 1 tablet by mouth every 8 hours as needed for Pain  MYESHA Nuno - GODWIN   Prenatal Vit-Fe Fumarate-FA (PRENATAL VITAMIN) 27-0.8 MG TABS Take 1 tablet by mouth daily  Omar Pacheco MD       Social History     Tobacco Use    Smoking status: Never Smoker    Smokeless tobacco: Never Used   Vaping Use    Vaping Use: Never used   Substance Use Topics    Alcohol use: No     Comment: occ    Drug use: No        Allergies   Allergen Reactions    Codeine Swelling    Raspberry    , No past medical history on file.,   Past Surgical History:   Procedure Laterality Date    APPENDECTOMY      EYE SURGERY      LEEP      TONSILLECTOMY     ,   Social History     Tobacco Use    Smoking status: Never Smoker    Smokeless tobacco: Never Used   Vaping Use    Vaping Use: Never used   Substance Use Topics    Alcohol use: No     Comment: occ    Drug use: No       PHYSICAL EXAMINATION:  [ INSTRUCTIONS:  \"[x]\" Indicates a positive item  \"[]\" Indicates a negative item  -- DELETE ALL ITEMS NOT EXAMINED]  Vital Signs: (As obtained by patient/caregiver or practitioner observation)    Blood pressure-  Heart rate-    Respiratory rate-    Temperature-  Pulse oximetry-     Constitutional: [x] Appears well-developed and well-nourished [x] No apparent distress      [] Abnormal-   Mental status  [x] Alert and awake  [x] Oriented to person/place/time [x]Able to follow commands      Eyes:  EOM    [x]  Normal  [] Abnormal-  Sclera  [x]  Normal  [] Abnormal -         Discharge [x]  None visible  [] Abnormal -    HENT:   [x] Normocephalic, atraumatic. [] Abnormal   [x] Mouth/Throat: Mucous membranes are moist.     External Ears [x] Normal  [] Abnormal-     Neck: [x] No visualized mass     Pulmonary/Chest: [x] Respiratory effort normal.  [x] No visualized signs of difficulty breathing or respiratory distress        [] Abnormal-      Musculoskeletal:   [x] Normal gait with no signs of ataxia         [x] Normal range of motion of neck        [] Abnormal-       Neurological:        [x] No Facial Asymmetry (Cranial nerve 7 motor function) (limited exam to video visit)          [x] No gaze palsy        [] Abnormal-         Skin:        [x] No significant exanthematous lesions or discoloration noted on facial skin         [] Abnormal-            Psychiatric:       [x] Normal Affect [x] No Hallucinations        [] Abnormal-     Other pertinent observable physical exam findings-     ASSESSMENT/PLAN:  1. Postpartum follow-up    Plan 6 week visit in office for IUD insertion. No follow-ups on file. Emmanuelle Shoemaker was evaluated through a synchronous (real-time) audio-video encounter. The patient (or guardian if applicable) is aware that this is a billable service.  Verbal consent to proceed has been obtained within the past 12 months. The visit was conducted pursuant to the emergency declaration under the [address] [place] [address] and the [employer] and [employer] Act. Patient identification was verified, and a caregiver was present when appropriate. The patient was located in a state where the provider was credentialed to provide care. Total time spent on this encounter: Not billed by time    --MYESHA Jaeger CNP on 6/29/2021 at 1:03 PM    An electronic signature was used to authenticate this note.

## 2021-07-12 ENCOUNTER — TELEPHONE (OUTPATIENT)
Dept: OBGYN CLINIC | Age: 26
End: 2021-07-12

## 2021-07-23 ENCOUNTER — PROCEDURE VISIT (OUTPATIENT)
Dept: OBGYN CLINIC | Age: 26
End: 2021-07-23
Payer: MEDICAID

## 2021-07-23 VITALS
DIASTOLIC BLOOD PRESSURE: 80 MMHG | WEIGHT: 132 LBS | SYSTOLIC BLOOD PRESSURE: 124 MMHG | BODY MASS INDEX: 25.78 KG/M2 | HEART RATE: 72 BPM

## 2021-07-23 DIAGNOSIS — Z30.018 ENCOUNTER FOR INITIAL PRESCRIPTION OF OTHER CONTRACEPTIVES: Primary | ICD-10-CM

## 2021-07-23 DIAGNOSIS — Z30.430 ENCOUNTER FOR IUD INSERTION: ICD-10-CM

## 2021-07-23 DIAGNOSIS — N92.6 IRREGULAR MENSES: ICD-10-CM

## 2021-07-23 PROCEDURE — 58300 INSERT INTRAUTERINE DEVICE: CPT | Performed by: NURSE PRACTITIONER

## 2021-07-23 NOTE — PATIENT INSTRUCTIONS
Patient Education        levonorgestrel emergency contraceptive  Pronunciation:  MILLICENT kat jeanine farleykerline wayne  Brand:  Nba Block, Fallback Solo, Morning After, My Choice, My Way, New Day, Opcicon One-Step, Plan B One-Step, React, Take Action  What is the most important information I should know about levonorgestrel emergency contraceptive? This medicine will not terminate pregnancy if the fertilized egg has already attached to the uterus. Ask a doctor or pharmacist before taking levonorgestrel if you regularly use medication for seizures, tuberculosis, or HIV/AIDS. Certain other medicines can make levonorgestrel less effective. What is levonorgestrel emergency contraceptive? Levonorgestrel emergency contraceptive is used to prevent pregnancy after unprotected sex or failure of other forms of birth control (such as condom breakage, or missing 2 or more birth control pills). Levonorgestrel emergency contraceptive may also be used for purposes not listed in this medication guide. What should I discuss with my healthcare provider before taking levonorgestrel emergency contraceptive? Do not use this medicine if you are already pregnant. Levonorgestrel emergency contraceptive will not terminate a pregnancy that has already begun (the fertilized egg has attached to the uterus). Levonorgestrel emergency contraceptive is not approved for use by anyone younger than 16years old. You should not use levonorgestrel if you are allergic to it. Ask a doctor or pharmacist before taking levonorgestrel if you regularly use medication for seizures, tuberculosis, or HIV/AIDS. Certain medications can make levonorgestrel less effective as an emergency form of contraception. Levonorgestrel emergency contraceptive is not intended for use as a routine form of birth control and should not be used in this manner. Talk with your doctor about the many forms of birth control available.   Levonorgestrel may slow breast milk production. Tell your doctor if you are breast-feeding. How should I take levonorgestrel emergency contraceptive? Use exactly as directed on the label, or as prescribed by your doctor. Levonorgestrel emergency contraceptive must be taken as soon as possible after unprotected sex (no later than 72 hours afterward). Call your doctor right away if you vomit within 2 hours after taking this medicine. Do not take a second dose without first asking your doctor. Visit your doctor within 3 weeks after taking levonorgestrel emergency contraceptive. A doctor should confirm that you are not pregnant, and that this medicine has not caused any harmful effects. If your period is late by 1 week or longer after the expected date, you may be pregnant. Get a pregnancy test and contact your doctor if you are pregnant. Levonorgestrel emergency contraceptive will not terminate pregnancy if the fertilized egg has attached to the uterus. Store at room temperature away from moisture and heat. What happens if I miss a dose? Since levonorgestrel emergency contraceptive is used as a single dose, it does not have a daily dosing schedule. What happens if I overdose? Because this medicine is supplied as a single tablet in an exact strength, an overdose is unlikely to occur when the levonorgestrel is used as directed. Do not take more than one tablet at the same time. What should I avoid while taking levonorgestrel emergency contraceptive? Levonorgestrel emergency contraceptive will not protect you from sexually transmitted diseases--including HIV and AIDS. Using a condom is the only way to protect yourself from these diseases. Avoid having unprotected sex. What are the possible side effects of levonorgestrel emergency contraceptive? Get emergency medical help if you have signs of an allergic reaction: hives; difficulty breathing; swelling of your face, lips, tongue, or throat.   Call your doctor or seek emergency medical help if you have severe pain in your lower stomach or side. This could be a sign of a tubal pregnancy (a pregnancy that implants in the fallopian tube instead of the uterus). A tubal pregnancy is a medical emergency. Common side effects may include:  · mild stomach pain;  · breast pain or tenderness;  · nausea, vomiting, diarrhea;  · headache, dizziness;  · feeling tired; or  · changes in your menstrual periods. This is not a complete list of side effects and others may occur. Call your doctor for medical advice about side effects. You may report side effects to FDA at 6-441-FDA-4958. What other drugs will affect levonorgestrel emergency contraceptive? Certain other medications can make levonorgestrel emergency contraceptive less effective, which may result in pregnancy. Ask a doctor or pharmacist if this medicine is safe to use if you are using any of the following medications:  · efavirenz;  · rifampin; or  · seizure medication --carbamazepine, felbamate, fosphenytoin, phenobarbital, phenytoin, primidone. This list is not complete. Other drugs may affect levonorgestrel, including prescription and over-the-counter medicines, vitamins, and herbal products. Not all possible drug interactions are listed here. Where can I get more information? Your doctor or pharmacist can provide more information about levonorgestrel emergency contraceptive. Remember, keep this and all other medicines out of the reach of children, never share your medicines with others, and use this medication only for the indication prescribed. Every effort has been made to ensure that the information provided by Yolanda Almanza Dr is accurate, up-to-date, and complete, but no guarantee is made to that effect. Drug information contained herein may be time sensitive.  [facility] information has been compiled for use by healthcare practitioners and consumers in the [place] and therefore [facility] does not warrant that uses outside of the [place] are appropriate, unless specifically indicated otherwise. [facility]'s drug information does not endorse drugs, diagnose patients or recommend therapy. [facility] drug information is an informational resource designed to assist licensed healthcare practitioners in caring for their patients and/or to serve consumers viewing this service as a supplement to, and not a substitute for, the expertise, skill, knowledge and judgment of healthcare practitioners. The absence of a warning for a given drug or drug combination in no way should be construed to indicate that the drug or drug combination is safe, effective or appropriate for any given patient. [facility] does not assume any responsibility for any aspect of healthcare administered with the aid of information [facility] provides. The information contained herein is not intended to cover all possible uses, directions, precautions, warnings, drug interactions, allergic reactions, or adverse effects. If you have questions about the drugs you are taking, check with your doctor, nurse or pharmacist.  Copyright 4731-7762 [address]: 3.03. Revision date: 3/23/2020. Care instructions adapted under license by [facility] [place] [facility] If you have questions about a medical condition or this instruction, always ask your healthcare professional. [place] [address] any warranty or liability for your use of this information.

## 2021-07-30 ASSESSMENT — ENCOUNTER SYMPTOMS
GASTROINTESTINAL NEGATIVE: 1
RESPIRATORY NEGATIVE: 1
EYES NEGATIVE: 1
ALLERGIC/IMMUNOLOGIC NEGATIVE: 1

## 2021-07-30 NOTE — PROGRESS NOTES
[facility]  CNM Office Note    Yuni Rios is a 31 y.o. female who presents today for her medical conditions/ complaints as noted below. Chief Complaint   Patient presents with    Procedure         HPI  Pt presents for IUD Mirena insertion. Patient Active Problem List   Diagnosis    Hyperemesis    39 weeks gestation of pregnancy     (normal spontaneous vaginal delivery)       No LMP recorded. [id number]    History reviewed. No pertinent past medical history. Past Surgical History:   Procedure Laterality Date    APPENDECTOMY      EYE SURGERY      LEEP      TONSILLECTOMY       No family history on file. Social History     Tobacco Use    Smoking status: Never Smoker    Smokeless tobacco: Never Used   Substance Use Topics    Alcohol use: No     Comment: occ       Current Outpatient Medications   Medication Sig Dispense Refill    ibuprofen (ADVIL;MOTRIN) 800 MG tablet Take 1 tablet by mouth every 8 hours as needed for Pain (Patient not taking: Reported on 2021) 30 tablet 0     No current facility-administered medications for this visit. Allergies   Allergen Reactions    Codeine Swelling    Raspberry      Vitals:    21 1435   BP: 124/80   Pulse: 72     Body mass index is 25.78 kg/m². Review of Systems   Constitutional: Negative. HENT: Negative. Eyes: Negative. Respiratory: Negative. Cardiovascular: Negative. Gastrointestinal: Negative. Endocrine: Negative. Genitourinary: Positive for menstrual problem. Negative for dysuria, pelvic pain and vaginal discharge. Musculoskeletal: Negative. Skin: Negative. Allergic/Immunologic: Negative. Neurological: Negative. Hematological: Negative. Psychiatric/Behavioral: Negative.         Physical Exam    IUD Insertion Procedure Note    /80   Pulse 72   Wt 132 lb (59.9 kg)   BMI 25.78 kg/m²     Pre-operative Diagnosis: irregular menses    Post-operative Diagnosis: same    Indications: irregular menses    Procedure Details    Urine pregnancy test was done and result was negative. The risks (including infection, bleeding, pain, and uterine perforation) and benefits of the procedure were explained to the patient and Written informed consent was obtained. Pt placed in lithotomy position. Speculum placed and cervix visualized. Cervix was cleansed with betadine. Single tooth tenaculum was placed on anterior lip. Uterus sounded to 7.5. IUD placed without difficulty. Strings trimmed. Patient tolerated procedure well. IUD Information:  Mirena, Lot # [id number], Expiration date Nov 2023  Mirena NDC# 00378-594-91      Condition:  Stable    Complications:  None    Plan:    The patient was advised to call for any fever or for prolonged or severe pain or bleeding. She was advised to use OTC ibuprofen as needed for mild to moderate pain. Diagnosis Orders   1. Encounter for initial prescription of other contraceptives  98578 - NM INSERT INTRAUTERINE DEVICE   2. Encounter for IUD insertion         MEDICATIONS:  No orders of the defined types were placed in this encounter. ORDERS:  Orders Placed This Encounter   Procedures    67808 - NM INSERT INTRAUTERINE DEVICE       PLAN:  1. Irregular Menses- IUD Mirena inserted without difficulty and pt tolerated well. Notify provider of any increased vaginal bleeding, persistent abdominal pain or cramping, fever, uterine tenderness, or foul smelling discharge. Return to office in 2-3 weeks for IUD check. If any questions or concerns, please call the office at [phone].

## 2021-10-27 ENCOUNTER — OFFICE VISIT (OUTPATIENT)
Dept: URGENT CARE | Age: 26
End: 2021-10-27
Payer: MEDICAID

## 2021-10-27 VITALS
RESPIRATION RATE: 18 BRPM | BODY MASS INDEX: 26.58 KG/M2 | OXYGEN SATURATION: 99 % | SYSTOLIC BLOOD PRESSURE: 145 MMHG | DIASTOLIC BLOOD PRESSURE: 81 MMHG | WEIGHT: 135.4 LBS | HEART RATE: 62 BPM | HEIGHT: 60 IN | TEMPERATURE: 97.8 F

## 2021-10-27 DIAGNOSIS — R11.2 NAUSEA AND VOMITING, INTRACTABILITY OF VOMITING NOT SPECIFIED, UNSPECIFIED VOMITING TYPE: Primary | ICD-10-CM

## 2021-10-27 DIAGNOSIS — R19.7 DIARRHEA, UNSPECIFIED TYPE: ICD-10-CM

## 2021-10-27 DIAGNOSIS — Z11.59 SCREENING FOR VIRAL DISEASE: ICD-10-CM

## 2021-10-27 LAB — SARS-COV-2, PCR: NOT DETECTED

## 2021-10-27 PROCEDURE — 99213 OFFICE O/P EST LOW 20 MIN: CPT | Performed by: NURSE PRACTITIONER

## 2021-10-27 ASSESSMENT — ENCOUNTER SYMPTOMS
ABDOMINAL PAIN: 1
VOMITING: 1
DIARRHEA: 1

## 2021-10-27 NOTE — PROGRESS NOTES
[address] [facility]  [address] [address] [place] [address]  Dept: [phone]  Dept Fax: [phone]  Loc: [phone]    Henry Fuller is a 31 y.o. female who presents today for her medical conditions/complaintsas noted below. Henry Fuller is c/o of Emesis (x2 days) and Diarrhea        HPI:     Emesis   This is a new problem. The current episode started today. The problem occurs 2 to 4 times per day. The problem has been unchanged. The emesis has an appearance of stomach contents. There has been no fever. Associated symptoms include abdominal pain and diarrhea. Pertinent negatives include no chills or fever. Diarrhea   This is a new problem. Episode onset: 2-3 days. The problem has been unchanged. Associated symptoms include abdominal pain and vomiting. Pertinent negatives include no chills or fever. Nothing aggravates the symptoms. She has tried nothing for the symptoms. The treatment provided no relief. History reviewed. No pertinent past medical history. Past Surgical History:   Procedure Laterality Date    APPENDECTOMY      EYE SURGERY      LEEP      TONSILLECTOMY         History reviewed. No pertinent family history. Social History     Tobacco Use    Smoking status: Never Smoker    Smokeless tobacco: Never Used   Substance Use Topics    Alcohol use: No     Comment: occ      Current Outpatient Medications   Medication Sig Dispense Refill    ibuprofen (ADVIL;MOTRIN) 800 MG tablet Take 1 tablet by mouth every 8 hours as needed for Pain 30 tablet 0     No current facility-administered medications for this visit.      Allergies   Allergen Reactions    Codeine Swelling    Raspberry        Health Maintenance   Topic Date Due    Hepatitis C screen  Never done    Varicella vaccine (1 of 2 - 2-dose childhood series) Never done    COVID-19 Vaccine (1) Never done    HIV screen  Never done    Pap smear  Never done    Flu vaccine (1) Never done  DTaP/Tdap/Td vaccine (2 - Td or Tdap) 03/04/2031    Hepatitis A vaccine  Aged Out    Hepatitis B vaccine  Aged Out    Hib vaccine  Aged Out    Meningococcal (ACWY) vaccine  Aged Out    Pneumococcal 0-64 years Vaccine  Aged Out       Subjective:     Review of Systems   Constitutional: Negative for chills and fever. Gastrointestinal: Positive for abdominal pain, diarrhea and vomiting. All other systems reviewed and are negative.      :Objective      Physical Exam  Vitals and nursing note reviewed. Constitutional:       General: She is not in acute distress. Appearance: Normal appearance. She is well-developed. She is not ill-appearing or diaphoretic. HENT:      Head: Normocephalic and atraumatic. Eyes:      Conjunctiva/sclera: Conjunctivae normal.      Pupils: Pupils are equal, round, and reactive to light. Cardiovascular:      Rate and Rhythm: Normal rate and regular rhythm. Heart sounds: Normal heart sounds. No murmur heard. Pulmonary:      Effort: Pulmonary effort is normal. No respiratory distress. Breath sounds: Normal breath sounds. No wheezing, rhonchi or rales. Abdominal:      General: Bowel sounds are normal.      Palpations: Abdomen is soft. Tenderness: There is generalized abdominal tenderness. Musculoskeletal:      Cervical back: Normal range of motion. Skin:     General: Skin is warm and dry. Findings: No rash. Neurological:      Mental Status: She is alert and oriented to person, place, and time. Psychiatric:         Mood and Affect: Mood normal.         Behavior: Behavior normal.       BP (!) 145/81   Pulse 62   Temp 97.8 °F (36.6 °C)   Resp 18   Ht 5' (1.524 m)   Wt 135 lb 6.4 oz (61.4 kg)   SpO2 99%   BMI 26.44 kg/m²     :Assessment       Diagnosis Orders   1. Nausea and vomiting, intractability of vomiting not specified, unspecified vomiting type     2. Diarrhea, unspecified type         :Plan   1.  Take phenergan that you have at home as needed for vomiting  2. Take clear liquids until no more vomiting for 4-6 hours  3. Advance to BRAT (bananas, rice, applesauce and toast) as tolerated. 4. If patient is not improving or developing any new/worsening symptoms then go to ER      No orders of the defined types were placed in this encounter. No follow-ups on file. No orders of the defined types were placed in this encounter. Patient given educational materials- see patient instructions. Discussed use, benefit, and side effects of prescribedmedications. All patient questions answered. Pt voiced understanding. Patient Instructions       Patient Education        Nausea and Vomiting: Care Instructions  Your Care Instructions     When you are nauseated, you may feel weak and sweaty and notice a lot of saliva in your mouth. Nausea often leads to vomiting. Most of the time you do not need to worry about nausea and vomiting, but they can be signs of other illnesses. Two common causes of nausea and vomiting are stomach flu and food poisoning. Nausea and vomiting from viral stomach flu will usually start to improve within 24 hours. Nausea and vomiting from food poisoning may last from 12 to 48 hours. The doctor has checked you carefully, but problems can develop later. If you notice any problems or new symptoms, get medical treatment right away. Follow-up care is a key part of your treatment and safety. Be sure to make and go to all appointments, and call your doctor if you are having problems. It's also a good idea to know your test results and keep a list of the medicines you take. How can you care for yourself at home? · To prevent dehydration, drink plenty of fluids. Choose water and other clear liquids until you feel better. If you have kidney, heart, or liver disease and have to limit fluids, talk with your doctor before you increase the amount of fluids you drink. · Rest in bed until you feel better.   · When you are able to eat, try clear soups, mild foods, and liquids until all symptoms are gone for 12 to 48 hours. Other good choices include dry toast, crackers, cooked cereal, and gelatin dessert, such as Jell-O. When should you call for help? Call 911 anytime you think you may need emergency care. For example, call if:    · You passed out (lost consciousness). Call your doctor now or seek immediate medical care if:    · You have symptoms of dehydration, such as:  ? Dry eyes and a dry mouth. ? Passing only a little urine. ? Feeling thirstier than usual.     · You have new or worsening belly pain.     · You have a new or higher fever.     · You vomit blood or what looks like coffee grounds. Watch closely for changes in your health, and be sure to contact your doctor if:    · You have ongoing nausea and vomiting.     · Your vomiting is getting worse.     · Your vomiting lasts longer than 2 days.     · You are not getting better as expected. Where can you learn more? Go to https://[employer].Haoqiao.cn. org and sign in to your eSeekers account. Enter [id number] in the Win Win Slots box to learn more about \"Nausea and Vomiting: Care Instructions. \"     If you do not have an account, please click on the \"Sign Up Now\" link. Current as of: July 1, 2021               Content Version: 13.0  © 1017-7848 Healthwise, Incorporated. Care instructions adapted under license by [facility] ([facility]). If you have questions about a medical condition or this instruction, always ask your healthcare professional. [place] [address] any warranty or liability for your use of this information. 1. Take phenergan that you have at home as needed for vomiting  2. Take clear liquids until no more vomiting for 4-6 hours  3. Advance to BRAT (bananas, rice, applesauce and toast) as tolerated.    4. If patient is not improving or developing any new/worsening symptoms then go to ER             Electronically signed by Easton Salguero Schuyler Nunez on 10/27/2021 at 2:55 PM

## 2021-10-27 NOTE — LETTER
[address] [facility]  [address] [place] [address] [address] [address]  Phone: [phone]  Fax: MYESHA Frazier        October 27, 2021     Patient: Beni Arriaag   YOB: 1995   Date of Visit: 10/27/2021       To Whom it May Concern:    Emeli Rice was seen in my clinic on 10/27/2021. She may return to work on 10/29/2021. If you have any questions or concerns, please don't hesitate to call.     Sincerely,         MYESHA Howard

## 2021-10-27 NOTE — PATIENT INSTRUCTIONS
Patient Education        Nausea and Vomiting: Care Instructions  Your Care Instructions     When you are nauseated, you may feel weak and sweaty and notice a lot of saliva in your mouth. Nausea often leads to vomiting. Most of the time you do not need to worry about nausea and vomiting, but they can be signs of other illnesses. Two common causes of nausea and vomiting are stomach flu and food poisoning. Nausea and vomiting from viral stomach flu will usually start to improve within 24 hours. Nausea and vomiting from food poisoning may last from 12 to 48 hours. The doctor has checked you carefully, but problems can develop later. If you notice any problems or new symptoms, get medical treatment right away. Follow-up care is a key part of your treatment and safety. Be sure to make and go to all appointments, and call your doctor if you are having problems. It's also a good idea to know your test results and keep a list of the medicines you take. How can you care for yourself at home? · To prevent dehydration, drink plenty of fluids. Choose water and other clear liquids until you feel better. If you have kidney, heart, or liver disease and have to limit fluids, talk with your doctor before you increase the amount of fluids you drink. · Rest in bed until you feel better. · When you are able to eat, try clear soups, mild foods, and liquids until all symptoms are gone for 12 to 48 hours. Other good choices include dry toast, crackers, cooked cereal, and gelatin dessert, such as Jell-O. When should you call for help? Call 911 anytime you think you may need emergency care. For example, call if:    · You passed out (lost consciousness). Call your doctor now or seek immediate medical care if:    · You have symptoms of dehydration, such as:  ? Dry eyes and a dry mouth. ? Passing only a little urine. ? Feeling thirstier than usual.     · You have new or worsening belly pain.     · You have a new or higher fever.   · You vomit blood or what looks like coffee grounds. Watch closely for changes in your health, and be sure to contact your doctor if:    · You have ongoing nausea and vomiting.     · Your vomiting is getting worse.     · Your vomiting lasts longer than 2 days.     · You are not getting better as expected. Where can you learn more? Go to https://chpekimberlyewhugo.InfernoRed Technology. org and sign in to your [employer] account. Enter [phone] [id number] in the [place] box to learn more about \"Nausea and Vomiting: Care Instructions. \"     If you do not have an account, please click on the \"Sign Up Now\" link. Current as of: July 1, 2021               Content Version: 13.0  © 2006-2021 Healthwise, Incorporated. Care instructions adapted under license by [facility] ([facility]). If you have questions about a medical condition or this instruction, always ask your healthcare professional. [address] any warranty or liability for your use of this information. 1. Take phenergan that you have at home as needed for vomiting  2. Take clear liquids until no more vomiting for 4-6 hours  3. Advance to BRAT (bananas, rice, applesauce and toast) as tolerated.    4. If patient is not improving or developing any new/worsening symptoms then go to ER

## 2021-12-23 ENCOUNTER — HOSPITAL ENCOUNTER (EMERGENCY)
Age: 26
Discharge: HOME | End: 2021-12-23
Attending: EMERGENCY MEDICINE
Payer: MEDICAID

## 2021-12-23 VITALS
HEART RATE: 76 BPM | OXYGEN SATURATION: 96 % | TEMPERATURE: 96.5 F | HEIGHT: 60 IN | BODY MASS INDEX: 25.52 KG/M2 | RESPIRATION RATE: 14 BRPM | WEIGHT: 130 LBS | SYSTOLIC BLOOD PRESSURE: 135 MMHG | DIASTOLIC BLOOD PRESSURE: 91 MMHG

## 2021-12-23 DIAGNOSIS — U07.1 COVID-19: Primary | ICD-10-CM

## 2021-12-23 LAB
ADENOVIRUS BY PCR: NOT DETECTED
BORDETELLA PARAPERTUSSIS BY PCR: NOT DETECTED
BORDETELLA PERTUSSIS BY PCR: NOT DETECTED
CHLAMYDOPHILIA PNEUMONIAE BY PCR: NOT DETECTED
CORONAVIRUS 229E BY PCR: NOT DETECTED
CORONAVIRUS HKU1 BY PCR: NOT DETECTED
CORONAVIRUS NL63 BY PCR: NOT DETECTED
CORONAVIRUS OC43 BY PCR: NOT DETECTED
HUMAN METAPNEUMOVIRUS BY PCR: NOT DETECTED
HUMAN RHINOVIRUS/ENTEROVIRUS BY PCR: NOT DETECTED
INFLUENZA A BY PCR: NOT DETECTED
INFLUENZA B BY PCR: NOT DETECTED
MYCOPLASMA PNEUMONIAE BY PCR: NOT DETECTED
PARAINFLUENZA VIRUS 1 BY PCR: NOT DETECTED
PARAINFLUENZA VIRUS 2 BY PCR: NOT DETECTED
PARAINFLUENZA VIRUS 3 BY PCR: NOT DETECTED
PARAINFLUENZA VIRUS 4 BY PCR: NOT DETECTED
RESPIRATORY SYNCYTIAL VIRUS BY PCR: NOT DETECTED
SARS-COV-2, PCR: DETECTED

## 2021-12-23 PROCEDURE — 99283 EMERGENCY DEPT VISIT LOW MDM: CPT

## 2021-12-23 PROCEDURE — 0202U NFCT DS 22 TRGT SARS-COV-2: CPT

## 2021-12-23 RX ORDER — ACETAMINOPHEN, ASPIRIN AND CAFFEINE 250; 250; 65 MG/1; MG/1; MG/1
1 TABLET, FILM COATED ORAL EVERY 6 HOURS PRN
COMMUNITY

## 2021-12-23 RX ORDER — NAPROXEN 500 MG/1
500 TABLET ORAL 2 TIMES DAILY PRN
Qty: 30 TABLET | Refills: 0 | Status: SHIPPED | OUTPATIENT
Start: 2021-12-23

## 2021-12-23 RX ORDER — KETOROLAC TROMETHAMINE 30 MG/ML
30 INJECTION, SOLUTION INTRAMUSCULAR; INTRAVENOUS ONCE
Status: DISCONTINUED | OUTPATIENT
Start: 2021-12-23 | End: 2021-12-23 | Stop reason: HOSPADM

## 2021-12-23 RX ORDER — ONDANSETRON 4 MG/1
4 TABLET, ORALLY DISINTEGRATING ORAL EVERY 8 HOURS PRN
Qty: 20 TABLET | Refills: 0 | Status: SHIPPED | OUTPATIENT
Start: 2021-12-23

## 2021-12-23 ASSESSMENT — ENCOUNTER SYMPTOMS
DIARRHEA: 0
NAUSEA: 0
NAUSEA: 1
SORE THROAT: 1
VOMITING: 0
SHORTNESS OF BREATH: 0
ABDOMINAL PAIN: 0
BACK PAIN: 0
RHINORRHEA: 0

## 2021-12-23 ASSESSMENT — PAIN SCALES - GENERAL: PAINLEVEL_OUTOF10: 7

## 2021-12-23 ASSESSMENT — PAIN DESCRIPTION - DESCRIPTORS: DESCRIPTORS: ACHING

## 2021-12-23 ASSESSMENT — PAIN DESCRIPTION - LOCATION: LOCATION: GENERALIZED

## 2021-12-23 NOTE — ED PROVIDER NOTES
[facility]  eMERGENCY dEPARTMENT eNCOUnter      Pt Name: Kaleb Hylton  MRN: [id number]  [place] 1995  Date of evaluation: 12/23/2021  Provider: Celeste Wei, [address]       Chief Complaint   Patient presents with    Fatigue     no taste, fever         HISTORY OF PRESENT ILLNESS   (Location/Symptom, Timing/Onset,Context/Setting, Quality, Duration, Modifying Factors, Severity)  Note limiting factors. Kaleb Hylton is a 31 y.o. female who presents to the emergency department with no taste or smell and no appetite since yesterday. Pt has been fatigued. Has not been vaccinated. No chronic medical problems     The history is provided by the patient. NursingNotes were reviewed. REVIEW OF SYSTEMS    (2-9 systems for level 4, 10 or more for level 5)     Review of Systems   Constitutional: Positive for fatigue. Negative for fever. Gastrointestinal: Positive for nausea. Negative for diarrhea and vomiting. Except as noted above the remainder of the review of systems was reviewed and negative. PAST MEDICAL HISTORY     Past Medical History:   Diagnosis Date    Headache          SURGICALHISTORY       Past Surgical History:   Procedure Laterality Date    APPENDECTOMY      EYE SURGERY      LEEP      TONSILLECTOMY           CURRENT MEDICATIONS       Discharge Medication List as of 12/23/2021  1:23 PM      CONTINUE these medications which have NOT CHANGED    Details   aspirin-acetaminophen-caffeine (EXCEDRIN MIGRAINE) 250-250-65 MG per tablet Take 1 tablet by mouth every 6 hours as needed for HeadachesHistorical Med             ALLERGIES     Codeine and Raspberry    FAMILY HISTORY     History reviewed. No pertinent family history.        SOCIAL HISTORY       Social History     Socioeconomic History    Marital status: Single     Spouse name: None    Number of children: None    Years of education: None    Highest education level: None   Occupational History    None   Tobacco Use    Smoking status: Never Smoker    Smokeless tobacco: Never Used   Vaping Use    Vaping Use: Never used   Substance and Sexual Activity    Alcohol use: Yes     Comment: occ    Drug use: No    Sexual activity: None   Other Topics Concern    None   Social History Narrative    None     Social Determinants of Health     Financial Resource Strain:     Difficulty of Paying Living Expenses: Not on file   Food Insecurity:     Worried About Running Out of Food in the Last Year: Not on file    Magdalena of Food in the Last Year: Not on file   Transportation Needs:     Lack of Transportation (Medical): Not on file    Lack of Transportation (Non-Medical): Not on file   Physical Activity:     Days of Exercise per Week: Not on file    Minutes of Exercise per Session: Not on file   Stress:     Feeling of Stress : Not on file   Social Connections:     Frequency of Communication with Friends and Family: Not on file    Frequency of Social Gatherings with Friends and Family: Not on file    Attends Druze Services: Not on file    Active Member of [address] [place] [address] or Organizations: Not on file    Attends Club or Organization Meetings: Not on file    Marital Status: Not on file   Intimate Partner Violence:     Fear of Current or Ex-Partner: Not on file    Emotionally Abused: Not on file    Physically Abused: Not on file    Sexually Abused: Not on file   Housing Stability:     Unable to Pay for Housing in the Last Year: Not on file    Number of [place] in the Last Year: Not on file    Unstable Housing in the Last Year: Not on file       SCREENINGS      @FLOW([id number])@      PHYSICAL EXAM    (up to 7 for level 4, 8 or more for level 5)     ED Triage Vitals [12/23/21 1118]   BP Temp Temp Source Pulse Resp SpO2 Height Weight   (!) 135/91 96.5 °F (35.8 °C) Temporal 76 14 96 % 5' (1.524 m) 130 lb (59 kg)       Physical Exam  Vitals and nursing note reviewed.    Constitutional:       Appearance: She is well-developed. HENT:      Head: Normocephalic and atraumatic. Eyes:      General: No scleral icterus. Right eye: No discharge. Left eye: No discharge. Cardiovascular:      Rate and Rhythm: Normal rate and regular rhythm. Heart sounds: Normal heart sounds. Pulmonary:      Effort: No respiratory distress. Breath sounds: Normal breath sounds. Abdominal:      Palpations: Abdomen is soft. Musculoskeletal:      Cervical back: Normal range of motion and neck supple. Skin:     Capillary Refill: Capillary refill takes less than 2 seconds. Neurological:      Mental Status: She is alert. Psychiatric:         Behavior: Behavior normal.         DIAGNOSTIC RESULTS     EKG: All EKG's are interpreted by the Emergency Department Physician who either signs or Co-signsthis chart in the absence of a cardiologist.        RADIOLOGY:   Denise Daubs such as CT, Ultrasound and MRI are read by the radiologist. Plain radiographic images are visualized and preliminarily interpreted by the emergency physician with the below findings:      Interpretation per the Radiologist below, if available at the time of this note:    No orders to display         ED BEDSIDEULTRASOUND:   Performed by ED Physician -none    LABS:  Labs Reviewed   RESPIRATORY PANEL, MOLECULAR, WITH COVID-19 - Abnormal; Notable for the following components:       Result Value    SARS-CoV-2, PCR DETECTED (*)     All other components within normal limits    Narrative:     Moe[facility] tel. ,  called result to Gwyneth Goodell RN ED, 12/23/2021 12:56, by Dwayne Preston       All other labs were within normal range or not returned as of this dictation.     EMERGENCY DEPARTMENT COURSE and DIFFERENTIALDIAGNOSIS/MDM:   Vitals:    Vitals:    12/23/21 1118   BP: (!) 135/91   Pulse: 76   Resp: 14   Temp: 96.5 °F (35.8 °C)   TempSrc: Temporal   SpO2: 96%   Weight: 130 lb (59 kg)   Height: 5' (1.524 m)           MDM  Discussion with pt about monoclonal infusion. Pt has no chronic health issues. Her weight is on the line for infusion. Pt can follow with outpt infusion if she is interested in this but there is a shortage.   Recommended to the pt to get vaccinated after 3 month time period       CONSULTS:  None    PROCEDURES:  Unless otherwise noted below, none     Procedures    FINAL IMPRESSION      1. COVID-19        DISPOSITION/PLAN   DISPOSITION Decision To Discharge 12/23/2021 01:10:53 PM      PATIENT REFERRED TO:  [facility] EMERGENCY DEPT  Sai Saeedkimberly  [phone]    If symptoms worsen      DISCHARGE MEDICATIONS:  Discharge Medication List as of 12/23/2021  1:23 PM      START taking these medications    Details   ondansetron (ZOFRAN ODT) 4 MG disintegrating tablet Take 1 tablet by mouth every 8 hours as needed for Nausea or Vomiting, Disp-20 tablet, R-0Normal      naproxen (NAPROSYN) 500 MG tablet Take 1 tablet by mouth 2 times daily as needed for Pain, Disp-30 tablet, R-0Normal                (Please note that portions of this note were completed with a voice recognitionprogram.  Efforts were made to edit the dictations but occasionally words are mis-transcribed.)    MYESHA Arshad (electronically signed)          MYESHA Arshad  12/23/21 1104

## 2021-12-23 NOTE — ED PROVIDER NOTES
[facility] EMERGENCY DEPT  eMERGENCY dEPARTMENT eNCOUnter      Pt Name: Stefany Burgess  MRN: [id number]  [place] 1995  Date of evaluation: 12/23/2021  Provider: Shyann Smith MD    [address] [place] [address]       Chief Complaint   Patient presents with    Fatigue     no taste, fever         HISTORY OF PRESENT ILLNESS   (Location/Symptom, Timing/Onset,Context/Setting, Quality, Duration, Modifying Factors, Severity)  Note limiting factors. Stefany Burgess is a 31 y.o. female who presents to the emergency department with loss of taste and fever. The patient has body aches and sweats and chills. Mild sore throat. No known Covid exposure. No Covid vaccination. HPI    NursingNotes were reviewed. REVIEW OF SYSTEMS    (2-9 systems for level 4, 10 or more for level 5)     Review of Systems   Constitutional: Positive for activity change, appetite change, chills, diaphoresis, fatigue and fever. HENT: Positive for sore throat. Negative for rhinorrhea. Respiratory: Negative for shortness of breath. Cardiovascular: Negative for chest pain and leg swelling. Gastrointestinal: Negative for abdominal pain, diarrhea, nausea and vomiting. Genitourinary: Negative for difficulty urinating. Musculoskeletal: Positive for arthralgias and myalgias. Negative for back pain and neck pain. Skin: Negative for rash. Neurological: Negative for weakness and headaches. Psychiatric/Behavioral: Negative for confusion. A complete review of systems was performed and is negative except as noted above in the HPI.        PAST MEDICAL HISTORY     Past Medical History:   Diagnosis Date    Headache          SURGICAL HISTORY       Past Surgical History:   Procedure Laterality Date    APPENDECTOMY      EYE SURGERY      LEEP      TONSILLECTOMY           CURRENT MEDICATIONS       Previous Medications    ASPIRIN-ACETAMINOPHEN-CAFFEINE (EXCEDRIN MIGRAINE) 250-250-65 MG PER TABLET    Take 1 tablet by mouth every 6 hours as needed for Headaches       ALLERGIES     Codeine and Raspberry    FAMILY HISTORY     History reviewed. No pertinent family history. SOCIAL HISTORY       Social History     Socioeconomic History    Marital status: Single     Spouse name: None    Number of children: None    Years of education: None    Highest education level: None   Occupational History    None   Tobacco Use    Smoking status: Never Smoker    Smokeless tobacco: Never Used   Vaping Use    Vaping Use: Never used   Substance and Sexual Activity    Alcohol use: Yes     Comment: occ    Drug use: No    Sexual activity: None   Other Topics Concern    None   Social History Narrative    None     Social Determinants of Health     Financial Resource Strain:     Difficulty of Paying Living Expenses: Not on file   Food Insecurity:     Worried About Running Out of Food in the Last Year: Not on file    [place] of Food in the Last Year: Not on file   Transportation Needs:     Lack of Transportation (Medical): Not on file    Lack of Transportation (Non-Medical):  Not on file   Physical Activity:     Days of Exercise per Week: Not on file    Minutes of Exercise per Session: Not on file   Stress:     Feeling of Stress : Not on file   Social Connections:     Frequency of Communication with Friends and Family: Not on file    Frequency of Social Gatherings with Friends and Family: Not on file    Attends Pentecostal Services: Not on file    Active Member of [address] [place] [address] or Organizations: Not on file    Attends Club or Organization Meetings: Not on file    Marital Status: Not on file   Intimate Partner Violence:     Fear of Current or Ex-Partner: Not on file    Emotionally Abused: Not on file    Physically Abused: Not on file    Sexually Abused: Not on file   Housing Stability:     Unable to Pay for Housing in the Last Year: Not on file    Number of [place] in the Last Year: Not on file    Unstable Housing in the Last Year: Not on file SCREENINGS             PHYSICAL EXAM    (up to 7 for level 4, 8 or more for level 5)     ED Triage Vitals [12/23/21 1118]   BP Temp Temp Source Pulse Resp SpO2 Height Weight   (!) 135/91 96.5 °F (35.8 °C) Temporal 76 14 96 % 5' (1.524 m) 130 lb (59 kg)       Physical Exam  Vitals and nursing note reviewed. Constitutional:       General: She is not in acute distress. Appearance: She is well-developed. She is not diaphoretic. HENT:      Head: Normocephalic and atraumatic. Right Ear: Tympanic membrane normal.      Left Ear: Tympanic membrane normal.      Mouth/Throat:      Pharynx: Posterior oropharyngeal erythema present. No oropharyngeal exudate. Eyes:      Pupils: Pupils are equal, round, and reactive to light. Cardiovascular:      Rate and Rhythm: Normal rate and regular rhythm. Heart sounds: Normal heart sounds. Pulmonary:      Effort: Pulmonary effort is normal. No respiratory distress. Breath sounds: Normal breath sounds. Abdominal:      General: Bowel sounds are normal. There is no distension. Palpations: Abdomen is soft. Tenderness: There is no abdominal tenderness. Musculoskeletal:         General: Normal range of motion. Cervical back: Normal range of motion and neck supple. Skin:     General: Skin is warm and dry. Findings: No rash. Neurological:      Mental Status: She is alert and oriented to person, place, and time. Cranial Nerves: No cranial nerve deficit. Motor: No abnormal muscle tone.       Coordination: Coordination normal.   Psychiatric:         Behavior: Behavior normal.         DIAGNOSTIC RESULTS     EKG: All EKG's are interpreted by the Emergency Department Physician who either signs or Co-signs this chart in the absence of a cardiologist.        RADIOLOGY:   Non-plain film images such as CT, Ultrasound and MRI are read by the radiologist. Plainradiographic images are visualized and preliminarily interpreted by the emergency physician with the below findings:        Interpretation per the Radiologist below, if available at the time of this note:    No orders to display         ED BEDSIDE ULTRASOUND:   Performed by ED Physician - none    LABS:  Labs Reviewed   RESPIRATORY PANEL, MOLECULAR, WITH COVID-19 - Abnormal; Notable for the following components:       Result Value    SARS-CoV-2, PCR DETECTED (*)     All other components within normal limits    Narrative:     Cristycira Bowen tel. ,  called result to Tami Arteaga RN ED, 12/23/2021 12:56, by Tiesha Lang       All other labs were within normal range or not returned as of this dictation.     EMERGENCY DEPARTMENT COURSE and DIFFERENTIALDIAGNOSIS/MDM:   Vitals:    Vitals:    12/23/21 1118   BP: (!) 135/91   Pulse: 76   Resp: 14   Temp: 96.5 °F (35.8 °C)   TempSrc: Temporal   SpO2: 96%   Weight: 130 lb (59 kg)   Height: 5' (1.524 m)       MDM      CONSULTS:  None    PROCEDURES:  Unless otherwise notedbelow, none     Procedures    FINAL IMPRESSION     1. COVID-19          DISPOSITION/PLAN   DISPOSITION Decision To Discharge 12/23/2021 01:10:53 PM      PATIENT REFERRED TO:  @FUP@    DISCHARGE MEDICATIONS:  New Prescriptions    No medications on file          (Please note that portions of this note were completed with a voice recognition program.  Efforts were made to edit the dictations butoccasionally words are mis-transcribed.)    Quin Peralta MD (electronically signed)  AttendingEmergency Physician         Quin Peralta MD  12/23/21 1694

## 2022-06-01 ENCOUNTER — OFFICE VISIT (OUTPATIENT)
Age: 27
End: 2022-06-01
Payer: MEDICAID

## 2022-06-01 VITALS
WEIGHT: 134.8 LBS | HEART RATE: 87 BPM | BODY MASS INDEX: 26.46 KG/M2 | SYSTOLIC BLOOD PRESSURE: 100 MMHG | RESPIRATION RATE: 18 BRPM | TEMPERATURE: 98.1 F | DIASTOLIC BLOOD PRESSURE: 62 MMHG | OXYGEN SATURATION: 100 % | HEIGHT: 60 IN

## 2022-06-01 DIAGNOSIS — B96.89 SINUSITIS, BACTERIAL: Primary | ICD-10-CM

## 2022-06-01 DIAGNOSIS — J32.9 SINUSITIS, BACTERIAL: Primary | ICD-10-CM

## 2022-06-01 PROCEDURE — 99213 OFFICE O/P EST LOW 20 MIN: CPT | Performed by: NURSE PRACTITIONER

## 2022-06-01 RX ORDER — AZELASTINE 1 MG/ML
1 SPRAY, METERED NASAL 2 TIMES DAILY
Qty: 30 ML | Refills: 5 | Status: SHIPPED | OUTPATIENT
Start: 2022-06-01

## 2022-06-01 RX ORDER — AMOXICILLIN AND CLAVULANATE POTASSIUM 875; 125 MG/1; MG/1
1 TABLET, FILM COATED ORAL 2 TIMES DAILY
Qty: 14 TABLET | Refills: 0 | Status: SHIPPED | OUTPATIENT
Start: 2022-06-01 | End: 2022-06-08

## 2022-06-01 RX ORDER — FLUTICASONE PROPIONATE 50 MCG
1 SPRAY, SUSPENSION (ML) NASAL DAILY
Qty: 32 G | Refills: 1 | Status: SHIPPED | OUTPATIENT
Start: 2022-06-01

## 2022-06-01 ASSESSMENT — ENCOUNTER SYMPTOMS
SINUS PRESSURE: 1
COLOR CHANGE: 0
VOICE CHANGE: 0
SINUS PAIN: 1
RHINORRHEA: 0
NAUSEA: 0
TROUBLE SWALLOWING: 0
EYES NEGATIVE: 1
RESPIRATORY NEGATIVE: 1
ALLERGIC/IMMUNOLOGIC NEGATIVE: 1
SORE THROAT: 0
FACIAL SWELLING: 0
ABDOMINAL PAIN: 0

## 2022-06-01 NOTE — PROGRESS NOTES
[address]  [address] [address] [place] [address]  Dept: [phone]  Dept Fax: [phone]  Loc: [phone]     Jose C Shaw is a 28 y.o. female who presents today for her medical conditions/complaintsas noted below. Jose C Shaw is c/o of Congestion (x2-3 weeks), Cough, Headache, and Facial Pain        HPI:     HPI     Sinusitis  This is a new problem. The current episode started 1 to 4 weeks ago. The problem is unchanged. There has been no fever. The fever has been present for less than 1 day. The pain is mild. Associated symptoms include congestion, coughing, ear pain, headaches, a hoarse voice, sinus pressure, a sore throat and swollen glands. Pertinent negatives include no chills, diaphoresis, neck pain, shortness of breath or sneezing. Past treatments include oral decongestants. The treatment provided mild relief. Past Medical History:   Diagnosis Date    Headache       Past Surgical History:   Procedure Laterality Date    APPENDECTOMY      EYE SURGERY      LEEP      TONSILLECTOMY         No family history on file.     Social History     Tobacco Use    Smoking status: Never Smoker    Smokeless tobacco: Never Used   Substance Use Topics    Alcohol use: Yes     Comment: occ      Current Outpatient Medications   Medication Sig Dispense Refill    amoxicillin-clavulanate (AUGMENTIN) 875-125 MG per tablet Take 1 tablet by mouth 2 times daily for 7 days 14 tablet 0    fluticasone (FLONASE) 50 MCG/ACT nasal spray 1 spray by Each Nostril route daily 32 g 1    azelastine (ASTELIN) 0.1 % nasal spray 1 spray by Nasal route 2 times daily Use in each nostril as directed 30 mL 5    aspirin-acetaminophen-caffeine (EXCEDRIN MIGRAINE) 250-250-65 MG per tablet Take 1 tablet by mouth every 6 hours as needed for Headaches      ondansetron (ZOFRAN ODT) 4 MG disintegrating tablet Take 1 tablet by mouth every 8 hours as needed for Nausea or Vomiting 20 tablet 0    naproxen (NAPROSYN) 500 MG tablet Take 1 tablet by mouth 2 times daily as needed for Pain 30 tablet 0     No current facility-administered medications for this visit. Allergies   Allergen Reactions    Codeine Swelling    Raspberry        Health Maintenance   Topic Date Due    Varicella vaccine (1 of 2 - 2-dose childhood series) Never done    COVID-19 Vaccine (1) Never done    Depression Screen  Never done    HIV screen  Never done    Hepatitis C screen  Never done    Pap smear  Never done    Flu vaccine (Season Ended) 09/01/2022    DTaP/Tdap/Td vaccine (2 - Td or Tdap) 03/04/2031    Hepatitis A vaccine  Aged Out    Hepatitis B vaccine  Aged Out    Hib vaccine  Aged Out    Meningococcal (ACWY) vaccine  Aged Out    Pneumococcal 0-64 years Vaccine  Aged Out       Subjective:     Review of Systems   Constitutional: Negative for chills, fatigue, fever and unexpected weight change. HENT: Positive for congestion, sinus pressure and sinus pain. Negative for ear discharge, facial swelling, rhinorrhea, sneezing, sore throat, trouble swallowing and voice change. Eyes: Negative. Respiratory: Negative. Cardiovascular: Negative. Gastrointestinal: Negative for abdominal pain and nausea. Musculoskeletal: Negative. Skin: Negative for color change. Allergic/Immunologic: Negative. Negative for environmental allergies. Neurological: Positive for headaches. Psychiatric/Behavioral: Negative. Objective:     Physical Exam  Vitals and nursing note reviewed. Constitutional:       Appearance: She is well-developed. She is not ill-appearing or toxic-appearing. HENT:      Head: Normocephalic and atraumatic. Right Ear: Hearing, tympanic membrane, ear canal and external ear normal.      Left Ear: Hearing, tympanic membrane, ear canal and external ear normal.      Nose:      Right Sinus: Maxillary sinus tenderness present.       Left Sinus: Maxillary sinus tenderness present. Mouth/Throat:      Mouth: Mucous membranes are moist.      Pharynx: Oropharynx is clear. Uvula midline. Eyes:      Conjunctiva/sclera: Conjunctivae normal.      Pupils: Pupils are equal, round, and reactive to light. Neck:      Trachea: Trachea normal.   Cardiovascular:      Rate and Rhythm: Normal rate and regular rhythm. Heart sounds: Normal heart sounds. Pulmonary:      Effort: Pulmonary effort is normal.      Breath sounds: No wheezing, rhonchi or rales. Abdominal:      General: Bowel sounds are normal.   Musculoskeletal:         General: Normal range of motion. Cervical back: Normal range of motion. Lymphadenopathy:      Head:      Right side of head: No submental, submandibular, tonsillar, preauricular, posterior auricular or occipital adenopathy. Left side of head: No submental, submandibular, tonsillar, preauricular, posterior auricular or occipital adenopathy. Cervical: No cervical adenopathy. Skin:     General: Skin is warm and moist.      Capillary Refill: Capillary refill takes less than 2 seconds. Neurological:      Mental Status: She is alert and oriented to person, place, and time. Psychiatric:         Speech: Speech normal.         Behavior: Behavior normal.         Thought Content: Thought content normal.         Judgment: Judgment normal.       /62   Pulse 87   Temp 98.1 °F (36.7 °C) (Temporal)   Resp 18   Ht 5' (1.524 m)   Wt 134 lb 12.8 oz (61.1 kg)   SpO2 100%   BMI 26.33 kg/m²     Assessment:          Diagnosis Orders   1. Sinusitis, bacterial  amoxicillin-clavulanate (AUGMENTIN) 875-125 MG per tablet    fluticasone (FLONASE) 50 MCG/ACT nasal spray    azelastine (ASTELIN) 0.1 % nasal spray       Plan:    No orders of the defined types were placed in this encounter. No follow-ups on file. No orders of the defined types were placed in this encounter.     Orders Placed This Encounter   Medications    amoxicillin-clavulanate (AUGMENTIN) 875-125 MG per tablet     Sig: Take 1 tablet by mouth 2 times daily for 7 days     Dispense:  14 tablet     Refill:  0    fluticasone (FLONASE) 50 MCG/ACT nasal spray     Si spray by Each Nostril route daily     Dispense:  32 g     Refill:  1    azelastine (ASTELIN) 0.1 % nasal spray     Si spray by Nasal route 2 times daily Use in each nostril as directed     Dispense:  30 mL     Refill:  5       Patient given educationalmaterials - see patient instructions. Discussed use, benefit, and side effectsof prescribed medications. All patient questions answered. Pt voiced understanding. Reviewed health maintenance. Instructed to continue current medications, diet andexercise. Patient agreed with treatment plan. Follow up as directed. Patient Instructions   1. Use flonse and astelin nose sprays as prescribed  2. Take antihistamine and decongestant as prescribed  3. Take antibiotic as prescribed  4. Increase fluids and rest  5.  Return to clinic if symptoms worsen or fail to improve          Electronically signed by MYESHA Alves CNP on 2022 at 6:16 PM

## 2022-06-01 NOTE — PATIENT INSTRUCTIONS
1. Use flonse and astelin nose sprays as prescribed  2. Take antihistamine and decongestant as prescribed  3. Take antibiotic as prescribed  4. Increase fluids and rest  5.  Return to clinic if symptoms worsen or fail to improve

## 2022-06-08 ENCOUNTER — APPOINTMENT (OUTPATIENT)
Dept: CT IMAGING | Facility: HOSPITAL | Age: 27
End: 2022-06-08

## 2022-06-08 ENCOUNTER — HOSPITAL ENCOUNTER (EMERGENCY)
Facility: HOSPITAL | Age: 27
Discharge: HOME | End: 2022-06-08
Admitting: EMERGENCY MEDICINE

## 2022-06-08 VITALS
HEIGHT: 60 IN | TEMPERATURE: 98.1 F | DIASTOLIC BLOOD PRESSURE: 86 MMHG | WEIGHT: 132 LBS | HEART RATE: 68 BPM | SYSTOLIC BLOOD PRESSURE: 126 MMHG | BODY MASS INDEX: 25.91 KG/M2 | OXYGEN SATURATION: 100 % | RESPIRATION RATE: 16 BRPM

## 2022-06-08 DIAGNOSIS — K59.00 CONSTIPATION, UNSPECIFIED CONSTIPATION TYPE: ICD-10-CM

## 2022-06-08 DIAGNOSIS — E87.6 HYPOKALEMIA: Primary | ICD-10-CM

## 2022-06-08 DIAGNOSIS — R51.9 NONINTRACTABLE HEADACHE, UNSPECIFIED CHRONICITY PATTERN, UNSPECIFIED HEADACHE TYPE: ICD-10-CM

## 2022-06-08 LAB
ALBUMIN SERPL-MCNC: 4.8 G/DL (ref 3.5–5.2)
ALBUMIN/GLOB SERPL: 1.8 G/DL
ALP SERPL-CCNC: 61 U/L (ref 39–117)
ALT SERPL W P-5'-P-CCNC: 8 U/L (ref 1–33)
ANION GAP SERPL CALCULATED.3IONS-SCNC: 10 MMOL/L (ref 5–15)
AST SERPL-CCNC: 13 U/L (ref 1–32)
B-HCG UR QL: NEGATIVE
BACTERIA UR QL AUTO: ABNORMAL /HPF
BASOPHILS # BLD AUTO: 0.03 10*3/MM3 (ref 0–0.2)
BASOPHILS NFR BLD AUTO: 0.4 % (ref 0–1.5)
BILIRUB SERPL-MCNC: 0.5 MG/DL (ref 0–1.2)
BILIRUB UR QL STRIP: NEGATIVE
BUN SERPL-MCNC: 13 MG/DL (ref 6–20)
BUN/CREAT SERPL: 26.5 (ref 7–25)
CALCIUM SPEC-SCNC: 8.9 MG/DL (ref 8.6–10.5)
CHLORIDE SERPL-SCNC: 103 MMOL/L (ref 98–107)
CLARITY UR: CLEAR
CO2 SERPL-SCNC: 27 MMOL/L (ref 22–29)
COLOR UR: YELLOW
CREAT SERPL-MCNC: 0.49 MG/DL (ref 0.57–1)
DEPRECATED RDW RBC AUTO: 36.9 FL (ref 37–54)
EGFRCR SERPLBLD CKD-EPI 2021: 131.8 ML/MIN/1.73
EOSINOPHIL # BLD AUTO: 0.18 10*3/MM3 (ref 0–0.4)
EOSINOPHIL NFR BLD AUTO: 2.2 % (ref 0.3–6.2)
ERYTHROCYTE [DISTWIDTH] IN BLOOD BY AUTOMATED COUNT: 11.4 % (ref 12.3–15.4)
EXPIRATION DATE: NORMAL
GLOBULIN UR ELPH-MCNC: 2.7 GM/DL
GLUCOSE SERPL-MCNC: 90 MG/DL (ref 65–99)
GLUCOSE UR STRIP-MCNC: NEGATIVE MG/DL
HCT VFR BLD AUTO: 38.4 % (ref 34–46.6)
HGB BLD-MCNC: 13.2 G/DL (ref 12–15.9)
HGB UR QL STRIP.AUTO: ABNORMAL
HYALINE CASTS UR QL AUTO: ABNORMAL /LPF
IMM GRANULOCYTES # BLD AUTO: 0.02 10*3/MM3 (ref 0–0.05)
IMM GRANULOCYTES NFR BLD AUTO: 0.2 % (ref 0–0.5)
INTERNAL NEGATIVE CONTROL: NORMAL
INTERNAL POSITIVE CONTROL: NORMAL
KETONES UR QL STRIP: ABNORMAL
LEUKOCYTE ESTERASE UR QL STRIP.AUTO: ABNORMAL
LIPASE SERPL-CCNC: 18 U/L (ref 13–60)
LYMPHOCYTES # BLD AUTO: 1.97 10*3/MM3 (ref 0.7–3.1)
LYMPHOCYTES NFR BLD AUTO: 24.6 % (ref 19.6–45.3)
Lab: NORMAL
MCH RBC QN AUTO: 30.5 PG (ref 26.6–33)
MCHC RBC AUTO-ENTMCNC: 34.4 G/DL (ref 31.5–35.7)
MCV RBC AUTO: 88.7 FL (ref 79–97)
MONOCYTES # BLD AUTO: 0.57 10*3/MM3 (ref 0.1–0.9)
MONOCYTES NFR BLD AUTO: 7.1 % (ref 5–12)
NEUTROPHILS NFR BLD AUTO: 5.24 10*3/MM3 (ref 1.7–7)
NEUTROPHILS NFR BLD AUTO: 65.5 % (ref 42.7–76)
NITRITE UR QL STRIP: NEGATIVE
NRBC BLD AUTO-RTO: 0 /100 WBC (ref 0–0.2)
PH UR STRIP.AUTO: 6.5 [PH] (ref 5–8)
PLATELET # BLD AUTO: 433 10*3/MM3 (ref 140–450)
PMV BLD AUTO: 10 FL (ref 6–12)
POTASSIUM SERPL-SCNC: 3 MMOL/L (ref 3.5–5.2)
PROT SERPL-MCNC: 7.5 G/DL (ref 6–8.5)
PROT UR QL STRIP: ABNORMAL
RBC # BLD AUTO: 4.33 10*6/MM3 (ref 3.77–5.28)
RBC # UR STRIP: ABNORMAL /HPF
REF LAB TEST METHOD: ABNORMAL
SODIUM SERPL-SCNC: 140 MMOL/L (ref 136–145)
SP GR UR STRIP: 1.02 (ref 1–1.03)
SQUAMOUS #/AREA URNS HPF: ABNORMAL /HPF
UROBILINOGEN UR QL STRIP: ABNORMAL
WBC # UR STRIP: ABNORMAL /HPF
WBC NRBC COR # BLD: 8.01 10*3/MM3 (ref 3.4–10.8)

## 2022-06-08 PROCEDURE — 87086 URINE CULTURE/COLONY COUNT: CPT | Performed by: NURSE PRACTITIONER

## 2022-06-08 PROCEDURE — 25010000002 DIPHENHYDRAMINE PER 50 MG: Performed by: NURSE PRACTITIONER

## 2022-06-08 PROCEDURE — 99283 EMERGENCY DEPT VISIT LOW MDM: CPT

## 2022-06-08 PROCEDURE — 85025 COMPLETE CBC W/AUTO DIFF WBC: CPT | Performed by: NURSE PRACTITIONER

## 2022-06-08 PROCEDURE — 74176 CT ABD & PELVIS W/O CONTRAST: CPT

## 2022-06-08 PROCEDURE — 81025 URINE PREGNANCY TEST: CPT | Performed by: NURSE PRACTITIONER

## 2022-06-08 PROCEDURE — 83690 ASSAY OF LIPASE: CPT | Performed by: NURSE PRACTITIONER

## 2022-06-08 PROCEDURE — 25010000002 METOCLOPRAMIDE PER 10 MG: Performed by: NURSE PRACTITIONER

## 2022-06-08 PROCEDURE — 96374 THER/PROPH/DIAG INJ IV PUSH: CPT

## 2022-06-08 PROCEDURE — 96375 TX/PRO/DX INJ NEW DRUG ADDON: CPT

## 2022-06-08 PROCEDURE — 81001 URINALYSIS AUTO W/SCOPE: CPT | Performed by: NURSE PRACTITIONER

## 2022-06-08 PROCEDURE — 70450 CT HEAD/BRAIN W/O DYE: CPT

## 2022-06-08 PROCEDURE — 80053 COMPREHEN METABOLIC PANEL: CPT | Performed by: NURSE PRACTITIONER

## 2022-06-08 RX ORDER — POTASSIUM CHLORIDE 750 MG/1
40 CAPSULE, EXTENDED RELEASE ORAL ONCE
Status: COMPLETED | OUTPATIENT
Start: 2022-06-08 | End: 2022-06-08

## 2022-06-08 RX ORDER — SODIUM CHLORIDE 0.9 % (FLUSH) 0.9 %
10 SYRINGE (ML) INJECTION AS NEEDED
Status: DISCONTINUED | OUTPATIENT
Start: 2022-06-08 | End: 2022-06-08 | Stop reason: HOSPADM

## 2022-06-08 RX ORDER — METOCLOPRAMIDE HYDROCHLORIDE 5 MG/ML
10 INJECTION INTRAMUSCULAR; INTRAVENOUS ONCE
Status: COMPLETED | OUTPATIENT
Start: 2022-06-08 | End: 2022-06-08

## 2022-06-08 RX ORDER — DIPHENHYDRAMINE HYDROCHLORIDE 50 MG/ML
12.5 INJECTION INTRAMUSCULAR; INTRAVENOUS ONCE
Status: COMPLETED | OUTPATIENT
Start: 2022-06-08 | End: 2022-06-08

## 2022-06-08 RX ORDER — POLYETHYLENE GLYCOL 3350 17 G/17G
17 POWDER, FOR SOLUTION ORAL DAILY
Qty: 5 EACH | Refills: 0 | Status: SHIPPED | OUTPATIENT
Start: 2022-06-08 | End: 2022-06-13

## 2022-06-08 RX ADMIN — DIPHENHYDRAMINE HYDROCHLORIDE 12.5 MG: 50 INJECTION, SOLUTION INTRAMUSCULAR; INTRAVENOUS at 12:14

## 2022-06-08 RX ADMIN — POTASSIUM CHLORIDE 40 MEQ: 10 CAPSULE, COATED, EXTENDED RELEASE ORAL at 16:00

## 2022-06-08 RX ADMIN — SODIUM CHLORIDE, POTASSIUM CHLORIDE, SODIUM LACTATE AND CALCIUM CHLORIDE 1000 ML: 600; 310; 30; 20 INJECTION, SOLUTION INTRAVENOUS at 12:16

## 2022-06-08 RX ADMIN — METOCLOPRAMIDE 10 MG: 5 INJECTION, SOLUTION INTRAMUSCULAR; INTRAVENOUS at 12:16

## 2022-06-08 NOTE — ED PROVIDER NOTES
Subjective   Patient is a 28yo female that presents to the emergency department today for headache, lower abdominal pain and nausea. Patient states she has had intermittent headaches for the last 3 weeks usually well controlled with excedrin migraine and ibuprofen. She states starting yesterday she had worsening headache with increased nausea, vomiting and diarrhea accompanied by generalized lower abdominal pain. She states she has been taking more NSAIDS than normal over the last few weeks due to her headaches not being controlled. She described her headaches as being sensitive to light and sound and locates her pain across the front of her forehead and bilateral temples. Patient denies urinary symptoms or unusual vaginal discharge. Patient denies smoking or alcohol use. Patient denies any other pain or recent sick exposure.           Review of Systems   Gastrointestinal: Positive for diarrhea, nausea and vomiting.   Neurological: Positive for headaches.       Past Medical History:   Diagnosis Date   • Migraines    • Urinary tract infection     taking antibiotic       Allergies   Allergen Reactions   • Codeine Angioedema   • Raspberry Rash       Past Surgical History:   Procedure Laterality Date   • APPENDECTOMY     • EYE SURGERY      removed superglue from eye as kid   • LEEP N/A 8/22/2018    Procedure: LOOP ELECTROSURGICAL  EXCISION PROCEDURE;  Surgeon: Lindsey Quispe MD;  Location: [facility] OR;  Service: Obstetrics/Gynecology   • TONSILLECTOMY         No family history on file.    Social History     Socioeconomic History   • Marital status: Single   Tobacco Use   • Smoking status: Never Smoker   • Smokeless tobacco: Never Used   Vaping Use   • Vaping Use: Never used   Substance and Sexual Activity   • Alcohol use: No   • Drug use: No           Objective   Physical Exam  Vitals and nursing note reviewed.   Constitutional:       Appearance: She is well-developed.   HENT:      Head: Normocephalic and  atraumatic.      Mouth/Throat:      Mouth: Mucous membranes are moist.      Pharynx: Oropharynx is clear.   Eyes:      Extraocular Movements: Extraocular movements intact.      Pupils: Pupils are equal, round, and reactive to light.   Cardiovascular:      Rate and Rhythm: Normal rate and regular rhythm.      Heart sounds: Normal heart sounds.   Pulmonary:      Effort: Pulmonary effort is normal.      Breath sounds: Normal breath sounds.   Abdominal:      General: Abdomen is flat. Bowel sounds are normal.      Palpations: Abdomen is soft.      Tenderness: There is abdominal tenderness in the right lower quadrant and left lower quadrant.      Comments: Generalized lower abdominal pain accompanied by nausea, vomiting and diarrhea   Skin:     General: Skin is warm and dry.   Neurological:      General: No focal deficit present.      Mental Status: She is alert and oriented to person, place, and time.   Psychiatric:         Mood and Affect: Mood normal.         Behavior: Behavior normal.         Procedures           ED Course  ED Course as of 06/08/22 1456   Wed Jun 08, 2022   1455 Patient reports feeling better.  Potassium was 3.  We did give her some p.o. potassium.  She is advised to increase SMI her diet.  The patient CT scan showed no acute findings.  Did show some constipations we will give her some MiraLAX.  She is advised to follow with her PCP in 1 to 2 days for recheck, advised return to the ER for any new or worsening symptoms.  She was sent home at this time in stable condition. [LF]      ED Course User Index  [LF] Sindy Durham, SOL                                         CT Head Without Contrast   Final Result   1. No acute intracranial abnormality is seen.                                                       This report was finalized on 06/08/2022 14:31 by Dr. Shaun Gupta MD.      CT Abdomen Pelvis Without Contrast   Final Result   1. No acute abnormality of the abdomen or pelvis.   2. The  appendix is surgically absent. No gallstones. No finding to   suggest cholecystitis.   3. An IUD in appropriate position.   4. Large volume stool in the colon. No evidence of obstruction.                                       This report was finalized on 06/08/2022 14:22 by Dr. Martín Hayes MD.        Labs Reviewed   COMPREHENSIVE METABOLIC PANEL - Abnormal; Notable for the following components:       Result Value    Creatinine 0.49 (*)     Potassium 3.0 (*)     BUN/Creatinine Ratio 26.5 (*)     All other components within normal limits    Narrative:     GFR Normal >60  Chronic Kidney Disease <60  Kidney Failure <15     URINALYSIS W/ CULTURE IF INDICATED - Abnormal; Notable for the following components:    Ketones, UA 80 mg/dL (3+) (*)     Blood, UA Moderate (2+) (*)     Protein, UA 30 mg/dL (1+) (*)     Leuk Esterase, UA Moderate (2+) (*)     All other components within normal limits    Narrative:     In absence of clinical symptoms, the presence of pyuria, bacteria, and/or nitrites on the urinalysis result does not correlate with infection.   CBC WITH AUTO DIFFERENTIAL - Abnormal; Notable for the following components:    RDW 11.4 (*)     RDW-SD 36.9 (*)     All other components within normal limits   URINALYSIS, MICROSCOPIC ONLY - Abnormal; Notable for the following components:    RBC, UA 6-12 (*)     WBC, UA 13-20 (*)     All other components within normal limits   LIPASE - Normal   URINE CULTURE   POCT PEFORM URINE PREGNANCY   CBC AND DIFFERENTIAL    Narrative:     The following orders were created for panel order CBC & Differential.  Procedure                               Abnormality         Status                     ---------                               -----------         ------                     CBC Auto Differential[[id number]]        Abnormal            Final result                 Please view results for these tests on the individual orders.             MDM  Number of Diagnoses or Management  Options  Constipation, unspecified constipation type: new and requires workup  Hypokalemia: new and requires workup  Nonintractable headache, unspecified chronicity pattern, unspecified headache type: new and requires workup     Amount and/or Complexity of Data Reviewed  Clinical lab tests: ordered and reviewed  Tests in the radiology section of CPT®: ordered and reviewed    Patient Progress  Patient progress: stable      Final diagnoses:   Hypokalemia   Constipation, unspecified constipation type   Nonintractable headache, unspecified chronicity pattern, unspecified headache type       ED Disposition  ED Disposition     ED Disposition   Discharge    Condition   Stable    Comment   --             Provider, No Known  [facility]  [place] [id number] [phone]    Call            Medication List      New Prescriptions    polyethylene glycol 17 g packet  Commonly known as: MIRALAX  Take 17 g by mouth Daily for 5 days.           Where to Get Your Medications      These medications were sent to [facility]/pharmacy #[id number] - [place], [place] - [address] AT ACROSS FROM [place] - [phone] [facility] [phone]   [address] [place] [address]    Hours: 24-hours Phone: [phone]   · polyethylene glycol 17 g packet          Sindy Durham, APRN  06/08/22 8554

## 2022-06-09 LAB — BACTERIA SPEC AEROBE CULT: NO GROWTH

## 2022-06-10 ENCOUNTER — PATIENT OUTREACH (OUTPATIENT)
Dept: CASE MANAGEMENT | Facility: OTHER | Age: 27
End: 2022-06-10

## 2022-07-05 ENCOUNTER — OFFICE VISIT (OUTPATIENT)
Dept: INTERNAL MEDICINE | Facility: CLINIC | Age: 27
End: 2022-07-05

## 2022-07-05 VITALS
RESPIRATION RATE: 15 BRPM | WEIGHT: 134 LBS | HEART RATE: 63 BPM | TEMPERATURE: 98.5 F | HEIGHT: 60 IN | OXYGEN SATURATION: 100 % | SYSTOLIC BLOOD PRESSURE: 107 MMHG | DIASTOLIC BLOOD PRESSURE: 62 MMHG | BODY MASS INDEX: 26.31 KG/M2

## 2022-07-05 DIAGNOSIS — G43.009 MIGRAINE WITHOUT AURA AND WITHOUT STATUS MIGRAINOSUS, NOT INTRACTABLE: Primary | ICD-10-CM

## 2022-07-05 PROBLEM — R11.10 HYPEREMESIS: Status: ACTIVE | Noted: 2020-10-26

## 2022-07-05 PROBLEM — O34.40 HISTORY OF CERVICAL LEEP BIOPSY AFFECTING CARE OF MOTHER, ANTEPARTUM: Status: ACTIVE | Noted: 2021-01-18

## 2022-07-05 PROBLEM — Z3A.39 39 WEEKS GESTATION OF PREGNANCY: Status: ACTIVE | Noted: 2021-06-03

## 2022-07-05 PROBLEM — Z98.890 HISTORY OF CERVICAL LEEP BIOPSY AFFECTING CARE OF MOTHER, ANTEPARTUM: Status: ACTIVE | Noted: 2021-01-18

## 2022-07-05 PROCEDURE — 99204 OFFICE O/P NEW MOD 45 MIN: CPT | Performed by: INTERNAL MEDICINE

## 2022-07-05 RX ORDER — ACETAMINOPHEN, ASPIRIN AND CAFFEINE 250; 250; 65 MG/1; MG/1; MG/1
1 TABLET, FILM COATED ORAL EVERY 6 HOURS PRN
COMMUNITY

## 2022-07-05 RX ORDER — TOPIRAMATE 25 MG/1
TABLET ORAL
Qty: 74 TABLET | Refills: 2 | Status: SHIPPED | OUTPATIENT
Start: 2022-07-05 | End: 2022-11-08

## 2022-07-05 NOTE — PROGRESS NOTES
Chief Complaint   Patient presents with   • Establish Care   • Migraine         History:  Lilly Herr is a 27 y.o. female who presents today for evaluation of the above problems.      She presents today to establish care for migraines.    She reports having headaches since having her first child 12 years ago, but they have worsened.  She states there is a stabbing feeling in her head and everything hurts.  In order to ease the headache she needs to go to a dark room and sleep it off.  She  gets nauseated and vomits if the headaches are very bad.  When she gets them they are rated 10 out of 10 and Excedrin also helps.  She gets 1 headache every 2 to 3 days sometimes lasting up to 2 days.    She does not get very much sleep with a 1-year-old at home.  She drinks some caffeine, 1-2 sodas per day, but does not smoke or use tobacco products.    She has never been on prophylactic medication for migraine headaches.    She denies other symptoms such as double vision, focal numbness or weakness.  At times though she does get numbness in her feet which resolves.    She did go to the emergency room on June 8, 2022 for abdominal pain, likely viral gastroenteritis.  Her potassium was low at 3.0.  All of the symptoms have resolved at this point.  HPI     Social History     Socioeconomic History   • Marital status: Single   Tobacco Use   • Smoking status: Never Smoker   • Smokeless tobacco: Never Used   Vaping Use   • Vaping Use: Never used   Substance and Sexual Activity   • Alcohol use: Yes     Comment: Occasionally   • Drug use: Never   • Sexual activity: Yes     Partners: Male     Birth control/protection: I.U.D.           ROS:  Review of Systems   Respiratory: Negative for cough and shortness of breath.    Cardiovascular: Negative for chest pain.   Gastrointestinal: Positive for nausea and vomiting.        When the headaches are severe   Neurological: Positive for light-headedness (intermittent), numbness and headaches.  Negative for dizziness, seizures, speech difficulty and weakness.         Current Outpatient Medications:   •  aspirin-acetaminophen-caffeine (EXCEDRIN MIGRAINE) 250-250-65 MG per tablet, Take 1 tablet by mouth Every 6 (Six) Hours As Needed for Headache., Disp: , Rfl:   •  topiramate (Topamax) 25 MG tablet, Take 1 tablet by mouth Daily for 14 days, THEN 1 tablet 2 (Two) Times a Day for 30 days., Disp: 74 tablet, Rfl: 2    Lab Results   Component Value Date    GLUCOSE 90 06/08/2022    BUN 13 06/08/2022    CREATININE 0.49 (L) 06/08/2022    EGFRIFNONA >60 10/26/2020    EGFRIFAFRI >59 10/26/2020    BCR 26.5 (H) 06/08/2022    K 3.0 (L) 06/08/2022    CO2 27.0 06/08/2022    CALCIUM 8.9 06/08/2022    ALBUMIN 4.80 06/08/2022    AST 13 06/08/2022    ALT 8 06/08/2022       WBC   Date Value Ref Range Status   06/08/2022 8.01 3.40 - 10.80 10*3/mm3 Final   06/05/2021 15.7 (H) 4.8 - 10.8 K/uL Final     RBC   Date Value Ref Range Status   06/08/2022 4.33 3.77 - 5.28 10*6/mm3 Final   06/05/2021 3.42 (L) 4.20 - 5.40 M/uL Final     Hemoglobin   Date Value Ref Range Status   06/08/2022 13.2 12.0 - 15.9 g/dL Final   06/05/2021 10.7 (L) 12.0 - 16.0 g/dL Final     Hematocrit   Date Value Ref Range Status   06/08/2022 38.4 34.0 - 46.6 % Final   06/05/2021 31.5 (L) 37.0 - 47.0 % Final     MCV   Date Value Ref Range Status   06/08/2022 88.7 79.0 - 97.0 fL Final   06/05/2021 92.1 81.0 - 99.0 fL Final     MCH   Date Value Ref Range Status   06/08/2022 30.5 26.6 - 33.0 pg Final   06/05/2021 31.3 (H) 27.0 - 31.0 pg Final     MCHC   Date Value Ref Range Status   06/08/2022 34.4 31.5 - 35.7 g/dL Final   06/05/2021 34.0 33.0 - 37.0 g/dL Final     RDW   Date Value Ref Range Status   06/08/2022 11.4 (L) 12.3 - 15.4 % Final   06/05/2021 12.8 11.5 - 14.5 % Final     RDW-SD   Date Value Ref Range Status   06/08/2022 36.9 (L) 37.0 - 54.0 fl Final     MPV   Date Value Ref Range Status   06/08/2022 10.0 6.0 - 12.0 fL Final   06/05/2021 10.3 9.4 - 12.3 fL  Final     Platelets   Date Value Ref Range Status   06/08/2022 433 140 - 450 10*3/mm3 Final   06/05/2021 257 130 - 400 K/uL Final     Neutrophil Rel %   Date Value Ref Range Status   12/23/2020 73.8 (H) 50.0 - 65.0 % Final     Neutrophil %   Date Value Ref Range Status   06/08/2022 65.5 42.7 - 76.0 % Final     Lymphocyte Rel %   Date Value Ref Range Status   12/23/2020 16.6 (L) 20.0 - 40.0 % Final     Lymphocyte %   Date Value Ref Range Status   06/08/2022 24.6 19.6 - 45.3 % Final     Monocyte Rel %   Date Value Ref Range Status   12/23/2020 7.6 0.0 - 10.0 % Final     Monocyte %   Date Value Ref Range Status   06/08/2022 7.1 5.0 - 12.0 % Final     Eosinophil Rel %   Date Value Ref Range Status   12/23/2020 0.9 0.0 - 5.0 % Final     Eosinophil %   Date Value Ref Range Status   06/08/2022 2.2 0.3 - 6.2 % Final     Basophil Rel %   Date Value Ref Range Status   12/23/2020 0.2 0.0 - 1.0 % Final     Basophil %   Date Value Ref Range Status   06/08/2022 0.4 0.0 - 1.5 % Final     Immature Grans %   Date Value Ref Range Status   06/08/2022 0.2 0.0 - 0.5 % Final     Neutrophils Absolute   Date Value Ref Range Status   12/23/2020 9.0 (H) 1.5 - 7.5 K/uL Final     Neutrophils, Absolute   Date Value Ref Range Status   06/08/2022 5.24 1.70 - 7.00 10*3/mm3 Final     Lymphocytes Absolute   Date Value Ref Range Status   12/23/2020 2.0 1.1 - 4.5 K/uL Final     Lymphocytes, Absolute   Date Value Ref Range Status   06/08/2022 1.97 0.70 - 3.10 10*3/mm3 Final     Monocytes Absolute   Date Value Ref Range Status   12/23/2020 0.90 0.00 - 0.90 K/uL Final     Monocytes, Absolute   Date Value Ref Range Status   06/08/2022 0.57 0.10 - 0.90 10*3/mm3 Final     Eosinophils Absolute   Date Value Ref Range Status   12/23/2020 0.10 0.00 - 0.60 K/uL Final     Eosinophils, Absolute   Date Value Ref Range Status   06/08/2022 0.18 0.00 - 0.40 10*3/mm3 Final     Basophils Absolute   Date Value Ref Range Status   12/23/2020 0.00 0.00 - 0.20 K/uL Final  "    Basophils, Absolute   Date Value Ref Range Status   06/08/2022 0.03 0.00 - 0.20 10*3/mm3 Final     Immature Grans, Absolute   Date Value Ref Range Status   06/08/2022 0.02 0.00 - 0.05 10*3/mm3 Final   12/23/2020 0.1 K/uL Final     nRBC   Date Value Ref Range Status   06/08/2022 0.0 0.0 - 0.2 /100 WBC Final         OBJECTIVE:  Visit Vitals  /62 (BP Location: Left arm, Patient Position: Sitting, Cuff Size: Adult)   Pulse 63   Temp 98.5 °F (36.9 °C) (Oral)   Resp 15   Ht 152.4 cm (60\")   Wt 60.8 kg (134 lb)   SpO2 100%   BMI 26.17 kg/m²      Physical Exam  Vitals and nursing note reviewed.   Constitutional:       General: She is not in acute distress.     Appearance: She is well-developed. She is not diaphoretic.   HENT:      Head: Normocephalic and atraumatic.   Eyes:      Pupils: Pupils are equal, round, and reactive to light.   Neck:      Thyroid: No thyromegaly.      Trachea: Phonation normal.   Cardiovascular:      Rate and Rhythm: Normal rate and regular rhythm.      Heart sounds: No murmur heard.  Pulmonary:      Effort: Pulmonary effort is normal. No respiratory distress.      Breath sounds: Normal breath sounds. No wheezing.   Skin:     General: Skin is warm and dry.      Coloration: Skin is not jaundiced or pale.      Findings: No bruising or erythema.   Neurological:      General: No focal deficit present.      Mental Status: She is alert and oriented to person, place, and time.      Cranial Nerves: No cranial nerve deficit.      Motor: No weakness.   Psychiatric:         Behavior: Behavior normal.         Thought Content: Thought content normal.         Judgment: Judgment normal.          Reviewed ED note from 6/8/22.     Assessment/Plan    Diagnoses and all orders for this visit:    1. Migraine without aura and without status migrainosus, not intractable (Primary)  -     topiramate (Topamax) 25 MG tablet; Take 1 tablet by mouth Daily for 14 days, THEN 1 tablet 2 (Two) Times a Day for 30 days.  " Dispense: 74 tablet; Refill: 2      Her migraine headaches have worsened over the last 12 years.  There are no red flag symptoms.  She is never been on prophylactic medication.  I would like to start her on Topamax.  She has an IUD we discussed the importance of birth control and avoidance of pregnancy.  She notes understanding.  Continue as needed Excedrin Migraine.    Would like her to recheck in 7 weeks or so.  Follow-up sooner if indicated.      Return in about 7 weeks (around 8/23/2022) for Recheck migraine.      SALINAS Lora MD  14:05 CDT  7/5/2022

## 2022-11-08 PROCEDURE — 87636 SARSCOV2 & INF A&B AMP PRB: CPT | Performed by: NURSE PRACTITIONER

## 2023-02-03 ENCOUNTER — OFFICE VISIT (OUTPATIENT)
Age: 28
End: 2023-02-03
Payer: MEDICAID

## 2023-02-03 VITALS
SYSTOLIC BLOOD PRESSURE: 118 MMHG | BODY MASS INDEX: 27.48 KG/M2 | TEMPERATURE: 98 F | WEIGHT: 140 LBS | OXYGEN SATURATION: 97 % | HEIGHT: 60 IN | DIASTOLIC BLOOD PRESSURE: 80 MMHG | HEART RATE: 70 BPM | RESPIRATION RATE: 19 BRPM

## 2023-02-03 DIAGNOSIS — R11.10 VOMITING, UNSPECIFIED VOMITING TYPE, UNSPECIFIED WHETHER NAUSEA PRESENT: ICD-10-CM

## 2023-02-03 DIAGNOSIS — J06.9 VIRAL URI WITH COUGH: Primary | ICD-10-CM

## 2023-02-03 DIAGNOSIS — Z11.52 ENCOUNTER FOR SCREENING FOR COVID-19: ICD-10-CM

## 2023-02-03 LAB
INFLUENZA A ANTIBODY: NORMAL
INFLUENZA B ANTIBODY: NORMAL
SARS-COV-2, PCR: NOT DETECTED

## 2023-02-03 PROCEDURE — 96372 THER/PROPH/DIAG INJ SC/IM: CPT

## 2023-02-03 PROCEDURE — 87804 INFLUENZA ASSAY W/OPTIC: CPT

## 2023-02-03 PROCEDURE — 99213 OFFICE O/P EST LOW 20 MIN: CPT

## 2023-02-03 RX ORDER — DEXAMETHASONE SODIUM PHOSPHATE 10 MG/ML
10 INJECTION INTRAMUSCULAR; INTRAVENOUS ONCE
Status: COMPLETED | OUTPATIENT
Start: 2023-02-03 | End: 2023-02-03

## 2023-02-03 RX ORDER — BROMPHENIRAMINE MALEATE, PSEUDOEPHEDRINE HYDROCHLORIDE, AND DEXTROMETHORPHAN HYDROBROMIDE 2; 30; 10 MG/5ML; MG/5ML; MG/5ML
5-10 SYRUP ORAL 4 TIMES DAILY PRN
Qty: 118 ML | Refills: 0 | Status: SHIPPED | OUTPATIENT
Start: 2023-02-03

## 2023-02-03 RX ADMIN — DEXAMETHASONE SODIUM PHOSPHATE 10 MG: 10 INJECTION INTRAMUSCULAR; INTRAVENOUS at 09:27

## 2023-02-03 ASSESSMENT — ENCOUNTER SYMPTOMS
SINUS PRESSURE: 1
VOMITING: 1
COUGH: 1

## 2023-02-03 NOTE — PROGRESS NOTES
[phone]  [address] [address] [place] [address]  Dept: [phone]  Dept Fax: [phone]  Loc: [phone]    Salma Garcia is a 28 y.o. female who presents today for her medical conditions/complaints as noted below. Salma Garcia is c/o of Congestion, Cough, Emesis, and Sweats        HPI:     HPI  Salma Garcia presents with complaints of cough, sweats, headaches, vomiting, sinus pressure and congestion. Symptoms began 2 days ago and have progressively worsened. OTC treatment includes excedrin (last dose this AM), sudafed and mucinex. Denies recent antibiotics and steroids. Denies recent covid19 infection. She is not vaccinated against covid19. Past Medical History:   Diagnosis Date    Headache      Past Surgical History:   Procedure Laterality Date    APPENDECTOMY      EYE SURGERY      LEEP      TONSILLECTOMY         No family history on file.     Social History     Tobacco Use    Smoking status: Never    Smokeless tobacco: Never   Substance Use Topics    Alcohol use: Yes     Comment: occ      Current Outpatient Medications   Medication Sig Dispense Refill    brompheniramine-pseudoephedrine-DM 2-30-10 MG/5ML syrup Take 5-10 mLs by mouth 4 times daily as needed for Congestion or Cough 118 mL 0    fluticasone (FLONASE) 50 MCG/ACT nasal spray 1 spray by Each Nostril route daily 32 g 1    azelastine (ASTELIN) 0.1 % nasal spray 1 spray by Nasal route 2 times daily Use in each nostril as directed 30 mL 5    aspirin-acetaminophen-caffeine (EXCEDRIN MIGRAINE) 250-250-65 MG per tablet Take 1 tablet by mouth every 6 hours as needed for Headaches      ondansetron (ZOFRAN ODT) 4 MG disintegrating tablet Take 1 tablet by mouth every 8 hours as needed for Nausea or Vomiting 20 tablet 0    naproxen (NAPROSYN) 500 MG tablet Take 1 tablet by mouth 2 times daily as needed for Pain 30 tablet 0     No current facility-administered medications for this visit. Allergies   Allergen Reactions    Codeine Swelling    Raspberry        Health Maintenance   Topic Date Due    COVID-19 Vaccine (1) Never done    Varicella vaccine (1 of 2 - 2-dose childhood series) Never done    Depression Screen  Never done    HIV screen  Never done    Hepatitis C screen  Never done    Pap smear  Never done    Flu vaccine (1) Never done    DTaP/Tdap/Td vaccine (2 - Td or Tdap) 03/04/2031    Hepatitis A vaccine  Aged Out    Hib vaccine  Aged Out    Meningococcal (ACWY) vaccine  Aged Out    Pneumococcal 0-64 years Vaccine  Aged Out       Subjective:     Review of Systems   Constitutional:  Positive for diaphoresis. HENT:  Positive for congestion and sinus pressure. Respiratory:  Positive for cough. Gastrointestinal:  Positive for vomiting (did not feel nauseated - more from headache). Neurological:  Positive for headaches.     :Objective      Physical Exam  Constitutional:       General: She is not in acute distress. Appearance: Normal appearance. She is ill-appearing. She is not toxic-appearing. HENT:      Head: Normocephalic and atraumatic. Right Ear: Tympanic membrane, ear canal and external ear normal.      Left Ear: Tympanic membrane, ear canal and external ear normal.      Nose: Congestion present. Mouth/Throat:      Mouth: Mucous membranes are moist.      Pharynx: Oropharynx is clear. Posterior oropharyngeal erythema present. No oropharyngeal exudate. Eyes:      General:         Right eye: No discharge. Left eye: No discharge. Conjunctiva/sclera: Conjunctivae normal.   Cardiovascular:      Rate and Rhythm: Normal rate and regular rhythm. Pulmonary:      Effort: Pulmonary effort is normal. No respiratory distress. Breath sounds: Normal breath sounds. Abdominal:      General: Abdomen is flat. Palpations: Abdomen is soft. Musculoskeletal:         General: Normal range of motion. Cervical back: Normal range of motion.  No rigidity. No pain with movement. Lymphadenopathy:      Cervical: No cervical adenopathy. Skin:     General: Skin is warm and dry. Capillary Refill: Capillary refill takes less than 2 seconds. Findings: No rash. Neurological:      General: No focal deficit present. Mental Status: She is alert and oriented to person, place, and time. Cranial Nerves: No cranial nerve deficit. Sensory: No sensory deficit. Motor: No weakness. Psychiatric:         Mood and Affect: Mood normal.     /80   Pulse 70   Temp 98 °F (36.7 °C)   Resp 19   Ht 5' (1.524 m)   Wt 140 lb (63.5 kg)   LMP 01/10/2023   SpO2 97%   BMI 27.34 kg/m²     :Assessment       Diagnosis Orders   1. Viral URI with cough  dexamethasone (DECADRON) injection 10 mg    brompheniramine-pseudoephedrine-DM 2-30-10 MG/5ML syrup      2. Vomiting, unspecified vomiting type, unspecified whether nausea present  POCT Influenza A/B      3. Encounter for screening for COVID-19  COVID-19          :Plan   Flu negative, will r/o covid19 as source of symptoms. Dex injection to help with headache and URI symptoms. Work note provided. Encouraged supportive care at home. Return precautions and home care education completed. Patient verbalized understanding. Orders Placed This Encounter   Procedures    COVID-19     Scheduling Instructions:      1) Due to current limited availability of the COVID-19 test, tests will be prioritized based on responses to questions above. Testing may be delayed due to volume. 2) Print and instruct patient to adhere to CDC home isolation program. (Link Above)              3) Set up or refer patient for a monitoring program.              4) Have patient sign up for and leverage [employer] (if not previously done). Order Specific Question:   Is this test for diagnosis or screening? Answer:   Diagnosis of ill patient     Order Specific Question:   Symptomatic for COVID-19 as defined by CDC? Answer:   Yes     Order Specific Question:   Date of Symptom Onset     Answer:   2/1/2023     Order Specific Question:   Hospitalized for COVID-19? Answer:   No     Order Specific Question:   Admitted to ICU for COVID-19? 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 Answer:   No     Order Specific Question:   Pregnant? Answer:   Unknown     Order Specific Question:   Previously tested for COVID-19? Answer:   Yes    COVID-19    COVID-19    POCT Influenza A/B     Results for orders placed or performed in visit on 02/03/23   POCT Influenza A/B   Result Value Ref Range    Influenza A Ab NEG     Influenza B Ab NEG        No follow-ups on file. Orders Placed This Encounter   Medications    dexamethasone (DECADRON) injection 10 mg    brompheniramine-pseudoephedrine-DM 2-30-10 MG/5ML syrup     Sig: Take 5-10 mLs by mouth 4 times daily as needed for Congestion or Cough     Dispense:  118 mL     Refill:  0       Patient given educational materials- see patient instructions. Discussed use, benefit, and side effects of prescribed medications. All patient questions answered. Pt voiced understanding. Patient Instructions   1. Covid test results will be called to you when they are available. 2. Avoid steroids for 3 months  3. Hydrate with water or gatorade  4. Bromfed cough syrup as needed - check labels before taking anything over the counter to ensure they dont have same ingredients   5. Tylenol or motrin for pain or fever  6. Warm salt water gargles or warm liquids for comfort. Warm tea with a tablespoon of honey is excellent for sore throat. 7. Cool mist humidifer   8.  If symptoms worsen, please seek reevaluation      Electronically signed by MYESHA Moreno CNP on 2/3/2023 at 9:47 AM

## 2023-02-03 NOTE — LETTER
[facility]  [address] [address]  Phone: [phone]  Fax: MYESHA Winn CNP        February 3, 2023     Patient: Austin Porras   YOB: 1995   Date of Visit: 2/3/2023       To Whom It May Concern: It is my medical opinion that Vimal Thrasher should remain out of work until 2/5/2023. If you have any questions or concerns, please don't hesitate to call.     Sincerely,        MYESHA Fam CNP

## 2023-02-03 NOTE — PATIENT INSTRUCTIONS
1. Covid test results will be called to you when they are available. 2. Avoid steroids for 3 months  3. Hydrate with water or gatorade  4. Bromfed cough syrup as needed - check labels before taking anything over the counter to ensure they dont have same ingredients   5. Tylenol or motrin for pain or fever  6. Warm salt water gargles or warm liquids for comfort. Warm tea with a tablespoon of honey is excellent for sore throat. 7. Cool mist humidifer   8.  If symptoms worsen, please seek reevaluation

## 2023-11-15 ENCOUNTER — OFFICE VISIT (OUTPATIENT)
Age: 28
End: 2023-11-15
Payer: MEDICAID

## 2023-11-15 VITALS
BODY MASS INDEX: 28.86 KG/M2 | DIASTOLIC BLOOD PRESSURE: 80 MMHG | HEIGHT: 60 IN | HEART RATE: 63 BPM | WEIGHT: 147 LBS | SYSTOLIC BLOOD PRESSURE: 122 MMHG | RESPIRATION RATE: 16 BRPM | TEMPERATURE: 98.3 F | OXYGEN SATURATION: 98 %

## 2023-11-15 DIAGNOSIS — J02.9 SORE THROAT: ICD-10-CM

## 2023-11-15 DIAGNOSIS — J06.9 VIRAL URI WITH COUGH: Primary | ICD-10-CM

## 2023-11-15 LAB
INFLUENZA A ANTIBODY: NEGATIVE
INFLUENZA B ANTIBODY: NEGATIVE
S PYO AG THROAT QL: NORMAL

## 2023-11-15 PROCEDURE — 99213 OFFICE O/P EST LOW 20 MIN: CPT | Performed by: NURSE PRACTITIONER

## 2023-11-15 ASSESSMENT — ENCOUNTER SYMPTOMS
SORE THROAT: 1
WHEEZING: 0
COUGH: 1
NAUSEA: 1
EYES NEGATIVE: 1
SINUS PRESSURE: 1
ALLERGIC/IMMUNOLOGIC NEGATIVE: 1
VOMITING: 0
DIARRHEA: 0
SHORTNESS OF BREATH: 0

## 2023-11-15 NOTE — PROGRESS NOTES
note.    --MYESHA Geller - CNP     EMR Dragon/translation disclaimer: Much of this encounter note is an electronic transcription/translation of spoken language to printed text. The electronic translation of spoken language may be erroneous, or at times, nonsensical words or phrases may be inadvertently transcribed.   Although I have reviewed the note for such errors, some may still exist.

## 2024-06-05 NOTE — OUTREACH NOTE
"AMBULATORY CASE MANAGEMENT NOTE    Name and Relationship of Patient/Support Person: Lilly Herr M - Self    Patient Outreach    [employer] patient seen at [facility] ED 6.8.22 for abdominal pain, headache, diarrhea, and hypokalemia.     Care Coordination    Spoke with Arabella in the HUB to schedule a new patient appointment on 6.21.22 @ 1:15 PM with Dr. Lora.     Patient Outreach    Spoke with patient to inform of the above.    SDOH updated and reviewed with the patient during this program:    Sent via Kateeva.    Adult Patient Profile  Questions/Answers    Flowsheet Row Most Recent Value   Symptoms/Conditions Managed at Home neurological   Barriers to Managing Health --  [sent SDOH via Kateeva]   Neurological Symptoms/Conditions headache   Neurological Management Strategies adequate rest   Neurological Comment patient reqeusted asssitance with establishing PCP \"I need to see whats going on with these headaches\" Discussed contacting insurance to inquire about getting a BP cuff         Send Education  Questions/Answers    Flowsheet Row Most Recent Value   Annual Wellness Visit:  --  [appointment scheduled with PCP for 6.21.22 to establish care]   Other Patient Education/Resources  24/7 [facility] Nurse Call Line   24/7 Nurse Call Line Education Method --  [printed on AVS]        RICK [facility]ART  Ambulatory Case Management    6/10/2022, 10:32 CDT  " Chief Complaint   Patient presents with    Gas    Ankle Injury     Sprained right ankle in March, still has pain and swelling at times       \"Have you been to the ER, urgent care clinic since your last visit?  Hospitalized since your last visit?\"    YES - When: approximately 3 months ago.  [facility] and [facility].    “Have you seen or consulted any other health care providers outside of [facility] since your last visit?”    NO      Click Here for Release of Records Request          6/5/2024     3:11 PM   PHQ-9    Little interest or pleasure in doing things 0   Feeling down, depressed, or hopeless 0   PHQ-2 Score 0   PHQ-9 Total Score 0       Health Maintenance Due   Topic Date Due    Hepatitis B vaccine (1 of 3 - 3-dose series) Never done    HIV screen  Never done    COVID-19 Vaccine (3 - 2023-24 season) 09/01/2023    Depression Screen  02/20/2024      signs, current medications, instructions and references & authorized prescriptions . Results of labs will be conveyed to patient, once available.  Pt verbalized instructions I provided and expressed understanding of discussion that was held today.    Please note that this dictation was completed with Nanoflex, the computer voice recognition software.  Quite often unanticipated grammatical, syntax, homophones, and other interpretive errors are inadvertently transcribed by the computer software.  Please disregard these errors.  Please excuse any errors that have escaped final proofreading.  Thank you.     No follow-ups on file.     Electronically signed by Selena Brasher MD on 6/5/24 at 4:13 PM EDT

## 2024-12-26 ENCOUNTER — OFFICE VISIT (OUTPATIENT)
Age: 29
End: 2024-12-26

## 2024-12-26 VITALS
TEMPERATURE: 98.1 F | OXYGEN SATURATION: 100 % | HEART RATE: 78 BPM | WEIGHT: 142 LBS | SYSTOLIC BLOOD PRESSURE: 123 MMHG | RESPIRATION RATE: 18 BRPM | BODY MASS INDEX: 27.73 KG/M2 | DIASTOLIC BLOOD PRESSURE: 72 MMHG

## 2024-12-26 DIAGNOSIS — R51.9 ACUTE NONINTRACTABLE HEADACHE, UNSPECIFIED HEADACHE TYPE: Primary | ICD-10-CM

## 2024-12-26 DIAGNOSIS — R11.2 NAUSEA AND VOMITING, UNSPECIFIED VOMITING TYPE: ICD-10-CM

## 2024-12-26 LAB
INFLUENZA A ANTIBODY: NEGATIVE
INFLUENZA B ANTIBODY: NEGATIVE
Lab: NORMAL
QC PASS/FAIL: NORMAL
SARS-COV-2, POC: NORMAL

## 2024-12-26 RX ORDER — KETOROLAC TROMETHAMINE 30 MG/ML
30 INJECTION, SOLUTION INTRAMUSCULAR; INTRAVENOUS ONCE
Status: COMPLETED | OUTPATIENT
Start: 2024-12-26 | End: 2024-12-26

## 2024-12-26 RX ADMIN — KETOROLAC TROMETHAMINE 30 MG: 30 INJECTION, SOLUTION INTRAMUSCULAR; INTRAVENOUS at 16:44

## 2024-12-26 ASSESSMENT — ENCOUNTER SYMPTOMS
APNEA: 0
EYE DISCHARGE: 0
SHORTNESS OF BREATH: 0
DIARRHEA: 0
EYE PAIN: 0
ABDOMINAL DISTENTION: 0
SORE THROAT: 0
TROUBLE SWALLOWING: 0
COUGH: 0
RHINORRHEA: 0
WHEEZING: 0
COLOR CHANGE: 0
CHEST TIGHTNESS: 0
EYE ITCHING: 0
ABDOMINAL PAIN: 0
NAUSEA: 1
VOMITING: 1
CONSTIPATION: 0

## 2024-12-26 NOTE — PATIENT INSTRUCTIONS
Flu and COVID negative.   Toradol injection administered during clinic visit.  Refrain from taking any NSAID(Ibuprofen, Naproxen, Aspirin) for the next 24 hours, may take Tylenol as needed.  Avoid triggers and aggravating factors.   Rest in a dark, quiet room.  If the headache becomes severe, or you experience vision loss or weakness, seek evaluation in the ER immediately.  Follow up with PCP if this treatment does not improve headache.   Work excuse given for tomorrow.

## 2024-12-26 NOTE — PROGRESS NOTES
[facility]  [facility]  [address] [place] [address] [place] [address]  Dept: [phone]  Dept Fax: [phone]  Loc: [phone]    Crystal Carrero is a 29 y.o. female who presents today for her medical conditions/complaints as noted below.  Crystal Carrero is complaining of Sinus Problem, Headache, and Nausea & Vomiting (X 4 days  )      HPI:     Patient presents for evaluation of headache, nausea, and vomiting x 4 days. Has used Excedrin and Aleve-D OTC with some relief. Reports she has only vomited once or twice but she believes this to be due to her migraine. Patient has a history of migraines. Reports that Excedrin typically helps but has not completely eliminated this migraine. Reports she has received Toradol injections in the past that have helped.    Past Medical History:   Diagnosis Date    Headache        Past Surgical History:   Procedure Laterality Date    APPENDECTOMY      EYE SURGERY      LEEP      TONSILLECTOMY         No family history on file.    Social History     Tobacco Use    Smoking status: Never    Smokeless tobacco: Never   Substance Use Topics    Alcohol use: Yes     Comment: occ        Current Outpatient Medications   Medication Sig Dispense Refill    aspirin-acetaminophen-caffeine (EXCEDRIN MIGRAINE) 250-250-65 MG per tablet Take 1 tablet by mouth every 6 hours as needed for Headaches      fluticasone (FLONASE) 50 MCG/ACT nasal spray 1 spray by Each Nostril route daily (Patient not taking: Reported on 12/26/2024) 32 g 1    azelastine (ASTELIN) 0.1 % nasal spray 1 spray by Nasal route 2 times daily Use in each nostril as directed (Patient not taking: Reported on 12/26/2024) 30 mL 5     No current facility-administered medications for this visit.       Allergies   Allergen Reactions    Codeine Swelling    Raspberry        Health Maintenance   Topic Date Due    Polio vaccine (2 of 3 - 4-dose series) 04/27/1998    Varicella vaccine (2

## 2024-12-26 NOTE — PROGRESS NOTES
Medication was administered by Jacqueline Rendon MA at 4:45 PM.    Medication: dexamethasone  Amount: 30mg  Route: intramuscular  Site: Dorsogluteal right    Patient tolerated well.

## (undated) DEVICE — STERILE RAYON TIPPED OB/GYN APPLICATORS: Brand: PURITAN

## (undated) DEVICE — Device

## (undated) DEVICE — GLV SURG TRIUMPH ORTHO W/ALOE PF LTX 7.0

## (undated) DEVICE — PK TURNOVER RM ADV

## (undated) DEVICE — ELECTRD BALL LLETZ 5MM 13CM STRL

## (undated) DEVICE — PAD MAJOR LITHOTOMY: Brand: MEDLINE INDUSTRIES, INC.

## (undated) DEVICE — SYR CONTRL LUERLOK 10CC

## (undated) DEVICE — NDL HYPO PRECISIONGLIDE/REG 18G 11/2 PNK